# Patient Record
Sex: MALE | Race: WHITE | NOT HISPANIC OR LATINO | Employment: FULL TIME | URBAN - NONMETROPOLITAN AREA
[De-identification: names, ages, dates, MRNs, and addresses within clinical notes are randomized per-mention and may not be internally consistent; named-entity substitution may affect disease eponyms.]

---

## 2019-02-21 ENCOUNTER — OFFICE VISIT CONVERTED (OUTPATIENT)
Dept: FAMILY MEDICINE CLINIC | Age: 49
End: 2019-02-21
Attending: NURSE PRACTITIONER

## 2019-08-05 ENCOUNTER — HOSPITAL ENCOUNTER (OUTPATIENT)
Dept: OTHER | Facility: HOSPITAL | Age: 49
Discharge: HOME | End: 2019-08-05

## 2019-08-05 ENCOUNTER — OFFICE VISIT CONVERTED (OUTPATIENT)
Dept: FAMILY MEDICINE CLINIC | Age: 49
End: 2019-08-05
Attending: NURSE PRACTITIONER

## 2019-08-05 LAB
ALBUMIN SERPL-MCNC: 4.7 G/DL (ref 3.5–5)
ALBUMIN/GLOB SERPL: 1.6 {RATIO} (ref 1.4–2.6)
ALP SERPL-CCNC: 44 U/L (ref 56–119)
ALT SERPL-CCNC: 21 U/L (ref 10–40)
ANION GAP SERPL CALC-SCNC: 19 MMOL/L (ref 8–19)
AST SERPL-CCNC: 23 U/L (ref 15–50)
BASOPHILS # BLD AUTO: 0.09 10*3/UL (ref 0–0.2)
BASOPHILS NFR BLD AUTO: 1.2 % (ref 0–3)
BILIRUB SERPL-MCNC: 0.4 MG/DL (ref 0.2–1.3)
BUN SERPL-MCNC: 19 MG/DL (ref 5–25)
BUN/CREAT SERPL: 18 {RATIO} (ref 6–20)
CALCIUM SERPL-MCNC: 9.8 MG/DL (ref 8.7–10.4)
CHLORIDE SERPL-SCNC: 103 MMOL/L (ref 99–111)
CHOLEST SERPL-MCNC: 166 MG/DL (ref 107–200)
CHOLEST/HDLC SERPL: 4.2 {RATIO} (ref 3–6)
CONV ABS IMM GRAN: 0.02 10*3/UL (ref 0–0.2)
CONV CO2: 24 MMOL/L (ref 22–32)
CONV IMMATURE GRAN: 0.3 % (ref 0–1.8)
CONV TOTAL PROTEIN: 7.6 G/DL (ref 6.3–8.2)
CREAT UR-MCNC: 1.03 MG/DL (ref 0.7–1.2)
DEPRECATED RDW RBC AUTO: 39 FL (ref 35.1–43.9)
EOSINOPHIL # BLD AUTO: 0.11 10*3/UL (ref 0–0.7)
EOSINOPHIL # BLD AUTO: 1.5 % (ref 0–7)
ERYTHROCYTE [DISTWIDTH] IN BLOOD BY AUTOMATED COUNT: 12.2 % (ref 11.6–14.4)
GFR SERPLBLD BASED ON 1.73 SQ M-ARVRAT: >60 ML/MIN/{1.73_M2}
GLOBULIN UR ELPH-MCNC: 2.9 G/DL (ref 2–3.5)
GLUCOSE SERPL-MCNC: 111 MG/DL (ref 70–99)
HCT VFR BLD AUTO: 39.2 % (ref 42–52)
HDLC SERPL-MCNC: 40 MG/DL (ref 40–60)
HGB BLD-MCNC: 13.7 G/DL (ref 14–18)
LDLC SERPL CALC-MCNC: 110 MG/DL (ref 70–100)
LYMPHOCYTES # BLD AUTO: 2.15 10*3/UL (ref 1–5)
LYMPHOCYTES NFR BLD AUTO: 29.6 % (ref 20–45)
MCH RBC QN AUTO: 30.5 PG (ref 27–31)
MCHC RBC AUTO-ENTMCNC: 34.9 G/DL (ref 33–37)
MCV RBC AUTO: 87.3 FL (ref 80–96)
MONOCYTES # BLD AUTO: 0.67 10*3/UL (ref 0.2–1.2)
MONOCYTES NFR BLD AUTO: 9.2 % (ref 3–10)
NEUTROPHILS # BLD AUTO: 4.22 10*3/UL (ref 2–8)
NEUTROPHILS NFR BLD AUTO: 58.2 % (ref 30–85)
NRBC CBCN: 0 % (ref 0–0.7)
OSMOLALITY SERPL CALC.SUM OF ELEC: 297 MOSM/KG (ref 273–304)
PLATELET # BLD AUTO: 286 10*3/UL (ref 130–400)
PMV BLD AUTO: 10.1 FL (ref 9.4–12.4)
POTASSIUM SERPL-SCNC: 3.8 MMOL/L (ref 3.5–5.3)
PSA SERPL-MCNC: 1.31 NG/ML (ref 0–4)
RBC # BLD AUTO: 4.49 10*6/UL (ref 4.7–6.1)
SODIUM SERPL-SCNC: 142 MMOL/L (ref 135–147)
TRIGL SERPL-MCNC: 79 MG/DL (ref 40–150)
TSH SERPL-ACNC: 2.38 M[IU]/L (ref 0.27–4.2)
VLDLC SERPL-MCNC: 16 MG/DL (ref 5–37)
WBC # BLD AUTO: 7.26 10*3/UL (ref 4.8–10.8)

## 2020-02-18 ENCOUNTER — OFFICE VISIT CONVERTED (OUTPATIENT)
Dept: FAMILY MEDICINE CLINIC | Age: 50
End: 2020-02-18
Attending: NURSE PRACTITIONER

## 2020-09-09 ENCOUNTER — OFFICE VISIT CONVERTED (OUTPATIENT)
Dept: FAMILY MEDICINE CLINIC | Age: 50
End: 2020-09-09
Attending: NURSE PRACTITIONER

## 2021-05-18 NOTE — PROGRESS NOTES
Jim Sparks  1970     Office/Outpatient Visit    Visit Date:  01:22 pm    Provider: Dianne Mott N.P. (Assistant: Felicity Parkinson MA)    Location: [facility]        Electronically signed by Dianne Mott N.P. on  2020 03:20:19 PM                             Subjective:        CC: Brendon is a 50 year old White male.  This is a follow-up visit.  establishment with new pcp.          HPI:           Patient presents with essential (primary) hypertension.  His current cardiac medication regimen includes a diuretic ( Hydrochlorothiazide (HCTZ) ).  He is tolerating the medication well without side effects.  Compliance with treatment has been fair; he skips some medication doses due to forgetfulness.  He would like to eat healthy, exercise and get off rx.      ROS:     CONSTITUTIONAL:  Negative for exercising regularly.      CARDIOVASCULAR:  Negative for chest pain, palpitations, tachycardia, orthopnea, and edema.      RESPIRATORY:  Negative for cough, dyspnea, and hemoptysis.      GASTROINTESTINAL:  Negative for melena.      MUSCULOSKELETAL:  Positive for shoulder and neck  pain. seen chiropractor, he fell 1 month ago.  was improving in his shoulder, he had an x-ray's, actually better today    NEUROLOGICAL:  Negative for paresthesias.          Past Medical History / Family History / Social History:         Last Reviewed on 2020 03:17 PM by Dianne Mott    Past Medical History:             PAST MEDICAL HISTORY         Hypertension         PREVENTIVE HEALTH MAINTENANCE             INFLUENZA VACCINE: was last done 2016         Surgical History:     NONE         Family History:     Father: Hypertension;  Type 2 Diabetes     Mother: Hypertension     Brother(s): 3 brother(s) total; 2 ;  Arrhythmia (  at 55 ); Hypertension;  Type 1 Diabetes (  at 50 )     Sister(s): Healthy; 1 sister(s) total     Paternal Grandfather:  at age 70's;   Alcoholism     Paternal Grandmother:  at age 80's     Maternal Grandfather:  at age 30; Cause of death was CVA     Maternal Grandmother:  at age 70's; Cause of death was MI;  Cancer (unspecified type)         Social History:     Occupation: [employer]     Marital Status:      Children: 1 child         Tobacco/Alcohol/Supplements:     Last Reviewed on 2019 10:19 AM by Emiliana Acosta    Tobacco: He has never smoked.          Alcohol: Frequency: Socially     Caffeine:  He admits to consuming caffeine via coffee ( 2 servings per day ) and soda ( 3 servings per day ).          Immunizations:     Fluzone Quadrivalent (3+ years) 10/1/2018        Allergies:     Last Reviewed on 2020 01:26 PM by Felicity Parkinson    No Known Allergies.        Current Medications: has HCTZ but not taking regularly at all     Last Reviewed on 2020 03:18 PM by Dianne Mott    None        Objective:        Vitals:         Current: 2020 1:25:26 PM    Ht:  5 ft, 11 in;  Wt: 233.2 lbs;  BMI: 32.5T: 98.3 F (oral);  BP: 168/101 mm Hg (right arm, sitting);  P: 90 bpm (right arm (BP Cuff), sitting);  sCr: 1.03 mg/dL;  GFR: 91.47        Exams:     PHYSICAL EXAM:     GENERAL: Vitals recorded well developed, well nourished;  well groomed;  no apparent distress;     NECK: carotid exam reveals no bruits;     RESPIRATORY: normal respiratory rate and pattern with no distress; normal breath sounds with no rales, rhonchi, wheezes or rubs;     CARDIOVASCULAR: normal rate; rhythm is regular;  no systolic murmur; no edema;     MUSCULOSKELETAL: full ROM of right shoulder, no pain;     PSYCHIATRIC:  appropriate affect and demeanor; normal speech pattern; grossly normal memory;         Assessment:         I10   Essential (primary) hypertension       M25.511   Pain in right shoulder           ORDERS:         Meds Prescribed:       [New Rx] lisinopriL 10 mg oral tablet [one daily orally], #30 (thirty) tablets, Refills: 2 (two)        [New Rx] Medrol (Jonathan) 4 mg oral Tablet, Dose Pack [take as directed with food], #21 (twenty one) tablets, Refills: 0 (zero)         Lab Orders:       FUTURE  Future order to be done at patients convenience  (Send-Out)            [id number]  [facility] CMP AND LIPID: [id number]  (Send-Out)                      Plan:         Essential (primary) hypertensionswitch to ACE /reviewed his vital sign flow sheet and previous labs        FOLLOW-UP TESTING #1: FOLLOW-UP LABORATORY:  Labs to be scheduled in the future include HTN/Lipid Panel: CMP, Lipid.      RECOMMENDATIONS given include: perform routine monitoring of blood pressure with home blood pressure cuff, reduction of dietary salt intake, weight loss, and Reduce caffiene.      FOLLOW-UP: fasting for labs and recheck bp           Prescriptions:       [New Rx] lisinopriL 10 mg oral tablet [one daily orally], #30 (thirty) tablets, Refills: 2 (two)           Orders:       FUTURE  Future order to be done at patients convenience  (Send-Out)            [id number]  [facility] CMP AND LIPID: [id number]  (Send-Out)              Pain in right shoulderconsider ortho referral if his symptoms are not improving         FOLLOW-UP: Advised to call if there is no improvement 2 weeks.  :.            Prescriptions:       [New Rx] Medrol (Jonathan) 4 mg oral Tablet, Dose Pack [take as directed with food], #21 (twenty one) tablets, Refills: 0 (zero)             Patient Recommendations:        For  Essential (primary) hypertension:            The following laboratory testing has been ordered: Begin monitoring your blood pressure by brief nurse visits at our office, a home blood pressure monitor, or by checking on the machines in pharmacies or stores.  Keep a log of the readings. Reduce the amount of salt in your diet. Try to lose some weight; even modest weight reduction can improve your blood pressure.          For  Pain in right shoulder:    Follow-up by phone if no improvement in 2 weeks.                 APPOINTMENT INFORMATION:        Monday Tuesday Wednesday Thursday Friday Saturday Sunday            Time:___________________AM  PM   Date:_____________________             Charge Capture:         Primary Diagnosis:     I10  Essential (primary) hypertension           Orders:      [id number]  Office/outpatient visit; established patient, level 4  (In-House)              M25.511  Pain in right shoulder

## 2021-05-18 NOTE — PROGRESS NOTES
Jim Sparks  1970     Office/Outpatient Visit    Visit Date: Wed, Sep 9, 2020 03:43 pm    Provider: Dianne Mott N.P. (Assistant: Felicity Parkinson MA)    Location: [facility]        Electronically signed by Dianne Mott N.P. on  09/09/2020 04:18:51 PM                             Subjective:        CC: [phone] dox video NOT TAKING LISINOPRILScott is a 50 year old White male.  This is a follow-up visit.  check up         HPI:           PHQ-9 Depression Screening: Completed form scanned and in chart; Total Score 1           Today's encounter is being done with a telehealth visit. He has consented verbally with two witnesses for todays treatment. Todays visit is being conducted by audio and video. Individuals present during the telemedicine consultation include radha Fernandez & TRISTIN Gonzales In regard to the essential (primary) hypertension, his current cardiac medication regimen includes a diuretic ( Hydrochlorothiazide (HCTZ) ).  He did not bring his blood pressure diary, but says that typical readings show systolics in the 125-150's and diastolics in the .  He is tolerating the medication well without side effects.  Compliance with treatment has been fair; he skips some medication doses due to forgetfulness and does not follow a diet and exercise regimen.  [place] and will be until  but can come home on some weekends.  Was exercising 5-8 miles a day and trying to eat healthy then with COVID, remdoleing, [place] of state all that has fallen by the way side.  Wants to do better.  He tried ACE and took for three days and did not tolerate, just felt weird and does not want to take.      ROS:     CONSTITUTIONAL:  Negative for fever.      CARDIOVASCULAR:  Negative for chest pain, palpitations, tachycardia, orthopnea, and edema.      RESPIRATORY:  Negative for recent cough and dyspnea.      GASTROINTESTINAL:  Negative for abdominal pain, heartburn,  constipation, diarrhea, and stool changes.      NEUROLOGICAL:  Negative for dizziness, headaches, paresthesias, and weakness.          Past Medical History / Family History / Social History:         Last Reviewed on 2020 04:01 PM by Dianne Mott    Past Medical History:             PAST MEDICAL HISTORY         Hypertension         PREVENTIVE HEALTH MAINTENANCE             INFLUENZA VACCINE: was last done 2016         Surgical History:     NONE         Family History:     Father: Hypertension;  Type 2 Diabetes     Mother: Hypertension     Brother(s): 3 brother(s) total; 2 ;  Arrhythmia (  at 55 ); Hypertension;  Type 1 Diabetes (  at 50 )     Sister(s): Healthy; 1 sister(s) total     Paternal Grandfather:  at age 70's;  Alcoholism     Paternal Grandmother:  at age 80's     Maternal Grandfather:  at age 30; Cause of death was CVA     Maternal Grandmother:  at age 70's; Cause of death was MI;  Cancer (unspecified type)         Social History:     Occupation: [employer]     Marital Status:      Children: 1 child         Tobacco/Alcohol/Supplements:     Last Reviewed on 2020 04:15 PM by Dianne Mott    Tobacco: He has never smoked.          Immunizations:     Fluzone Quadrivalent (3+ years) 10/1/2018        Allergies:     Last Reviewed on 2020 03:47 PM by Felicity Parkinson    lisinopril:          Current Medications:     Last Reviewed on 2020 03:46 PM by Felicity Parkinson    hydroCHLOROthiazide 25 mg oral tablet [1 tab daily]    lisinopriL 10 mg oral tablet [one daily orally]        Objective:        Exams:     PHYSICAL EXAM:     GENERAL: Vitals recorded well developed, well nourished;  well groomed;  no apparent distress;     RESPIRATORY: normal appearance and symmetric expansion of chest wall;     NEUROLOGIC: GROSSLY INTACT     PSYCHIATRIC:  appropriate affect and demeanor; normal speech pattern; grossly normal memory;         Assessment:          Z13.31   Encounter for screening for depression       I10   Essential (primary) hypertension           ORDERS:         Meds Prescribed:       [New Rx] amLODIPine 2.5 mg oral tablet [take 1 tablet (2.5 mg) by oral route once daily], #30 (thirty) tablets, Refills: 2 (two)         Lab Orders:       FUTURE  Future order to be done at patients convenience  (Send-Out)            [id number]  [facility] CMP AND LIPID: [id number]  (Send-Out)              Other Orders:         Depression screen negative  (In-House)                      Plan:         Encounter for screening for depression    MIPS PHQ-9 Depression Screening: Completed form scanned and in chart; Total Score 1; Negative Depression Screen Telehealth: Verbal consent obtained for visit to occur via televideo conferencing           Orders:         Depression screen negative  (In-House)              Essential (primary) hypertensionhe has plenty of HCTZ since he only takes half of the time, to take daily, add norvasc        FOLLOW-UP TESTING #1: FOLLOW-UP LABORATORY:  Labs to be scheduled in the future include HTN/Lipid Panel: CMP, Lipid.      RECOMMENDATIONS given include: perform routine monitoring of blood pressure with home blood pressure cuff, reduction of dietary salt intake, and take medication as prescribed, try not to miss doses.      FOLLOW-UP: fasting for labs/ order to be mailed           Prescriptions:       [New Rx] amLODIPine 2.5 mg oral tablet [take 1 tablet (2.5 mg) by oral route once daily], #30 (thirty) tablets, Refills: 2 (two)           Orders:       FUTURE  Future order to be done at patients convenience  (Send-Out)            [id number]  [facility] CMP AND LIPID: [id number]  (Send-Out)                  Patient Recommendations:        For  Essential (primary) hypertension:            The following laboratory testing has been ordered: Begin monitoring your blood pressure by brief nurse visits at our office, a home blood pressure monitor, or  by checking on the machines in pharmacies or stores.  Keep a log of the readings. Reduce the amount of salt in your diet.              Charge Capture:         Primary Diagnosis:     Z13.31  Encounter for screening for depression           Orders:      [id number]  Office/outpatient visit; established patient, level 3  (In-House)              Depression screen negative  (In-House)              I10  Essential (primary) hypertension

## 2021-05-18 NOTE — PROGRESS NOTES
Jim Sparks 1970     Office/Outpatient Visit    Visit Date:  04:58 pm    Provider: Emiliana Acosta N.P. (Assistant: Felicity Parkinson MA)    Location: [facility]        Electronically signed by Emiliana Acosta N.P. on  2019 08:38:34 AM                             SUBJECTIVE:        CC:     Brendon is a 49 year old White male.  He presents with sinus drainage onset 4-5 days. Patient also states he believes his BP is running high. He states he use to be on HCTZ in the past but never got a refill from prescribing Dr.          HPI:         Brendon presents with hypertension.  Pt states being on meds in the past. He was exercising and lowered his bp.          Cough details; pt states sore throat, chest congestion, productive green cough. States nasal drainage. Taking tylenol cold and flu without relief.      ROS:     CONSTITUTIONAL:  Negative for chills, fatigue, fever and weight change.      CARDIOVASCULAR:  Negative for chest pain, orthopnea, paroxysmal nocturnal dyspnea and pedal edema.      RESPIRATORY:  Positive for recent cough.   Negative for dyspnea or cough.      GASTROINTESTINAL:  Negative for abdominal pain, heartburn, constipation, diarrhea, and stool changes.      NEUROLOGICAL:  Negative for dizziness, headaches, paresthesias, and weakness.      PSYCHIATRIC:  Negative for anxiety and depression.          PMH/FMH/SH:     Last Reviewed on 2019 05:29 PM by Emiliana Acosta    Past Medical History:             PAST MEDICAL HISTORY         Hypertension         PREVENTIVE HEALTH MAINTENANCE             INFLUENZA VACCINE: was last done 2016         Surgical History:     NONE         Family History:     Father: Hypertension;  Type 2 Diabetes     Mother: Hypertension     Brother(s): 3 brother(s) total; 1 ;  Hypertension;  Type 1 Diabetes     Sister(s): Healthy; 1 sister(s) total     Paternal Grandfather:  at age 70's;  Alcoholism     Paternal  Grandmother:  at age 80's     Maternal Grandfather:  at age 30; Cause of death was CVA     Maternal Grandmother:  at age 70's; Cause of death was MI;  Cancer (unspecified type)         Social History:     Occupation: [employer]     Marital Status:      Children: 1 child         Tobacco/Alcohol/Supplements:     Last Reviewed on 2019 05:29 PM by Emiliana Acosta    Tobacco: He has never smoked.          Alcohol: Frequency: Socially     Caffeine:  He admits to consuming caffeine via coffee ( 2 servings per day ) and soda ( 3 servings per day ).          Substance Abuse History:     Last Reviewed on 2019 05:29 PM by Emiliana Acosta    NEGATIVE         Mental Health History:     Last Reviewed on 2019 05:29 PM by Emiliana Acosta        Communicable Diseases (eg STDs):     Last Reviewed on 2019 05:29 PM by Emiliana Acosta            Current Problems:     Last Reviewed on 2019 05:29 PM by Emiliana Acosta    Hypertension     Cough         Immunizations:     Fluzone Quadrivalent (3+ years) 10/1/2018         Allergies:     Last Reviewed on 2019 05:29 PM by Emiliana Acosta      No Known Drug Allergies.         Current Medications:     Last Reviewed on 2019 05:29 PM by Emiliana Acosta    None        OBJECTIVE:        Vitals:         Current: 2019 5:05:18 PM    Ht:  5 ft, 11 in;  Wt: 233.4 lbs;  BMI: 32.6    T: 98.4 F (oral);  BP: 163/97 mm Hg (right arm, sitting);  P: 79 bpm (right arm (BP Cuff), sitting);  sCr: 1.12 mg/dL;  GFR: 85.08        Exams:     PHYSICAL EXAM:     GENERAL: Vitals recorded well developed, well nourished;  well groomed;  no apparent distress;     EYES: lids and lacrimal system are normal in appearance; extraocular movements intact; conjunctiva and cornea are normal; PERRLA;     E/N/T: EARS: external auditory canal erythematous on the left;  NOSE: nasal mucosa is erythematous;  OROPHARYNX: oral mucosa reveals erythema;     NECK:  supple, full  ROM; no thyromegaly; no carotid bruits;     RESPIRATORY: normal respiratory rate and pattern with no distress; normal breath sounds with no rales, rhonchi, wheezes or rubs;     CARDIOVASCULAR: normal rate; rhythm is regular;  normal S1; normal S2; no systolic murmur; no cyanosis; no edema;     GASTROINTESTINAL: nontender, nondistended; no hepatosplenomegaly or masses; no bruits;     SKIN:  no significant rashes or lesions; no suspicious moles;     MUSCULOSKELETAL:  Normal range of motion, strength and tone;     NEUROLOGICAL:  cranial nerves, motor and sensory function, reflexes, gait and coordination are all intact;     PSYCHIATRIC:  appropriate affect and demeanor; normal speech pattern; grossly normal memory;         ASSESSMENT:           401.1   I10  Hypertension              DDx:     466.0   E78.5  Acute bronchitis              DDx:         ORDERS:         Meds Prescribed:       Hydrochlorothiazide (HCTZ) 25mg Tablet 1 tab daily  #90 (Ninety) tablet(s) Refills: 0       Augmentin (Amoxicillin/Clavulanate) 875mg/125mg Tablet 1 tab bid X 10 days  #20 (Twenty) tablet(s) Refills: 0       Bromfed-DM (Brompheniramine/Dextromethorphan/Pseudoephedrine) Syrup 1  to 2  tsp po every 4-6 hrs prn cough/congestion.  #240 ([address] [place] [address]) ml Refills: 0         Lab Orders:       APPTO  Appointment need  (In-House)                   PLAN:          Hypertension         FOLLOW-UP: Schedule a follow-up visit in 3 months..            Prescriptions:       Hydrochlorothiazide (HCTZ) 25mg Tablet 1 tab daily  #90 (Ninety) tablet(s) Refills: 0           Orders:       APPTO  Appointment need  (In-House)            Acute bronchitis           Prescriptions:       Augmentin (Amoxicillin/Clavulanate) 875mg/125mg Tablet 1 tab bid X 10 days  #20 (Twenty) tablet(s) Refills: 0       Bromfed-DM (Brompheniramine/Dextromethorphan/Pseudoephedrine) Syrup 1  to 2  tsp po every 4-6 hrs prn cough/congestion.  #240 ([address] [place] [address]) ml  Refills: 0           Patient Education Handouts:       Acute Bronchitis              Patient Recommendations:        For  Hypertension:     Schedule a follow-up visit in 3 months.                APPOINTMENT INFORMATION:        Monday Tuesday Wednesday Thursday Friday Saturday Sunday            Time:___________________AM  PM   Date:_____________________             CHARGE CAPTURE:           Primary Diagnosis:     401.1 Hypertension            I10    Essential (primary) hypertension              Orders:          [id number]   Office/outpatient visit; established patient, level 3  (In-House)             APPTO   Appointment need  (In-House)           466.0 Acute bronchitis            E78.5    Hyperlipidemia, unspecified        ADDENDUMS:      ____________________________________    Addendum: 02/28/2019 10:43 AM - Emiliana Acosta        466.0 Acute bronchitis       Remove:     E78.5   Hyperlipidemia, unspecified    Add: J20.9 Acute bronchitis

## 2021-05-18 NOTE — PROGRESS NOTES
Jim Sparks 1970     Office/Outpatient Visit    Visit Date: Mon, Aug 5, 2019 09:50 am    Provider: Emiliana Acosta N.P. (Assistant: Casie Spicer MA)    Location: [facility]        Electronically signed by Emiliana Acosta N.P. on  08/05/2019 12:22:47 PM                             SUBJECTIVE:        CC:     Brendon is a 49 year old White male.  This is a follow-up visit.          HPI:         Concerning hypertension, pt states doing well on med. Here for f/u. States elevated today due to coming to office and nerves. He puts his pill in his shirt and forgets to take it when he gets to work. States he washes most of his pills!          Concerning health checkup, he cannot recall when he last had a physical exam.  He has never had a chest x-ray. He has never had an ECG. A hearing test was done it was abnormal.   He's had vision screening done 2 years ago and this was abnormal, revealing reading glasses.  He does not perform regular testicular self-exams.  Depression screening is positive for sadness.  He is current with his influenza immunization.      Smoking Status:  Nonsmoker         PHQ-9 Depression Screening: Completed form scanned and in chart; Total Score 0 Alcohol Consumption Screening: Completed form scanned and in chart; Total Score 2     ROS:     CONSTITUTIONAL:  Negative for chills, fatigue and fever.      CARDIOVASCULAR:  Negative for chest pain, orthopnea, paroxysmal nocturnal dyspnea and pedal edema.      RESPIRATORY:  Negative for dyspnea and cough.      GASTROINTESTINAL:  Negative for abdominal pain, heartburn, constipation, diarrhea, and stool changes.      MUSCULOSKELETAL:  Negative for arthralgias, back pain, and myalgias.      NEUROLOGICAL:  Negative for dizziness, headaches, paresthesias, and weakness.      PSYCHIATRIC:  Negative for anxiety and depression.          PMH/FMH/SH:     Last Reviewed on 8/05/2019 10:19 AM by Emiliana Acosta    Past Medical History:              PAST MEDICAL HISTORY         Hypertension         PREVENTIVE HEALTH MAINTENANCE             INFLUENZA VACCINE: was last done 2016         Surgical History:     NONE         Family History:     Father: Hypertension;  Type 2 Diabetes     Mother: Hypertension     Brother(s): 3 brother(s) total; 1 ;  Hypertension;  Type 1 Diabetes     Sister(s): Healthy; 1 sister(s) total     Paternal Grandfather:  at age 70's;  Alcoholism     Paternal Grandmother:  at age 80's     Maternal Grandfather:  at age 30; Cause of death was CVA     Maternal Grandmother:  at age 70's; Cause of death was MI;  Cancer (unspecified type)         Social History:     Occupation: [employer]     Marital Status:      Children: 1 child         Tobacco/Alcohol/Supplements:     Last Reviewed on 2019 10:19 AM by Emiliana Acosta    Tobacco: He has never smoked.          Alcohol: Frequency: Socially     Caffeine:  He admits to consuming caffeine via coffee ( 2 servings per day ) and soda ( 3 servings per day ).          Substance Abuse History:     Last Reviewed on 2019 10:19 AM by Emiliana Acosta    NEGATIVE         Mental Health History:     Last Reviewed on 2019 10:19 AM by Emiliana Acosta        Communicable Diseases (eg STDs):     Last Reviewed on 2019 10:19 AM by Emiliana Acosta            Current Problems:     Last Reviewed on 2019 10:19 AM by Emiliana Acosta    Hypertension     Screening for depression         Immunizations:     Fluzone Quadrivalent (3+ years) 10/1/2018         Allergies:     Last Reviewed on 2019 10:19 AM by Emiliana Acosta      No Known Drug Allergies.         Current Medications:     Last Reviewed on 2019 10:19 AM by Emiliana Acosta    Hydrochlorothiazide (HCTZ) 25mg Tablet 1 tab daily         OBJECTIVE:        Vitals:         Current: 2019 9:55:55 AM    Ht:  5 ft, 11 in;  Wt: 221.8 lbs;  BMI: 30.9    T: 98.6 F (oral);  BP: 151/85 mm Hg (left  arm, sitting);  P: 67 bpm (left arm (BP Cuff), sitting);  sCr: 1.12 mg/dL;  GFR: 83.25        Exams:     PHYSICAL EXAM:     GENERAL: Vitals recorded well developed, well nourished;  well groomed;  no apparent distress;     EYES: lids and lacrimal system are normal in appearance; extraocular movements intact; conjunctiva and cornea are normal; PERRLA;     E/N/T:  normal EACs, TMs, nasal/oral mucosa, teeth, gingiva, and oropharynx;     NECK:  supple, full ROM; no thyromegaly; no carotid bruits;     RESPIRATORY: normal respiratory rate and pattern with no distress; normal breath sounds with no rales, rhonchi, wheezes or rubs;     CARDIOVASCULAR: normal rate; rhythm is regular;  normal S1; normal S2; no systolic murmur; no cyanosis; no edema;     GASTROINTESTINAL: nontender, nondistended; no hepatosplenomegaly or masses; no bruits;     SKIN:  no significant rashes or lesions; no suspicious moles;     MUSCULOSKELETAL:  Normal range of motion, strength and tone;     NEUROLOGICAL:  cranial nerves, motor and sensory function, reflexes, gait and coordination are all intact;     PSYCHIATRIC:  appropriate affect and demeanor; normal speech pattern; grossly normal memory;         ASSESSMENT:           401.1   I10  Hypertension              DDx:     V70.0   Z00.00  Health checkup              DDx:     V76.44   Z12.5  Screening for prostate cancer              DDx:     V79.0   Z13.31  Screening for depression              DDx:         ORDERS:         Meds Prescribed:       Refill of: Hydrochlorothiazide (HCTZ) 25mg Tablet 1 tab daily  #90 (Ninety) tablet(s) Refills: 1         Lab Orders:       [id number]  [facility] PHYSICAL: CMP, CBC, TSH, LIPID: [id number], [id number], [id number]  (Send-Out)         *  [employer] Medicare screening PSA  (Send-Out)           Other Orders:         Depression screen negative  (In-House)           Negative EtOH screen  (In-House)                   PLAN: Refused Colonoscopy.          Hypertension            Prescriptions:       Refill of: Hydrochlorothiazide (HCTZ) 25mg Tablet 1 tab daily  #90 (Ninety) tablet(s) Refills: 1          Health checkup     LABORATORY:  Labs ordered to be performed today include PHYSICAL PANEL; CMP, CBC, TSH, LIPID and PSA.  MIPS Negative Depression Screen Negative alcohol screen           Orders:       [id number]  [facility] PHYSICAL: CMP, CBC, TSH, LIPID: [id number], [id number], [id number], [id number]  (Send-Out)         *  [employer] Medicare screening PSA  (Send-Out)           Depression screen negative  (In-House)           Negative EtOH screen  (In-House)               Other Orders:       [id number]  Office/outpatient visit; established patient, level 3  (In-House)           CHARGE CAPTURE:           Primary Diagnosis:     401.1 Hypertension            I10    Essential (primary) hypertension    V70.0 Health checkup            Z00.00    Encounter for general adult medical examination without abnormal findings              Orders:          [id number]   Preventive medicine, established patient, age 40-64 years  (In-House)                Depression screen negative  (In-House)                Negative EtOH screen  (In-House)           V76.44 Screening for prostate cancer            Z12.5    Encounter for screening for malignant neoplasm of prostate    V79.0 Screening for depression            Z13.31    Encounter for screening for depression        Other Orders:           [id number]  Office/outpatient visit; established patient, level 3  (In-House)

## 2021-06-22 NOTE — TELEPHONE ENCOUNTER
Pharmacy is requesting a refill of HCTZ 25mg one daily. LV- 09/09/20, LF- 03/23/21, #90. Dmitri pt needs appt.

## 2021-06-23 DIAGNOSIS — I10 ESSENTIAL HYPERTENSION: Primary | ICD-10-CM

## 2021-06-23 RX ORDER — HYDROCHLOROTHIAZIDE 25 MG/1
25 TABLET ORAL DAILY
Qty: 30 TABLET | Refills: 0 | Status: SHIPPED | OUTPATIENT
Start: 2021-06-23 | End: 2021-06-25

## 2021-06-23 NOTE — TELEPHONE ENCOUNTER
LAST OV: 9/9/20  NEXT OV: None  LAST LAB: 8/5/19    PLEASE SIGN OR ADVISE    Lab Results   Component Value Date    BUN 19 08/05/2019    CREATININE 1.03 08/05/2019    BCR 18 08/05/2019    K 3.8 08/05/2019    CO2 24 08/05/2019    CALCIUM 9.8 08/05/2019    ALBUMIN 4.7 08/05/2019    LABIL2 1.6 08/05/2019    AST 23 08/05/2019    ALT 21 08/05/2019     Contains abnormal data Lipid Panel  Order: [id number]  Status:  Final result   Visible to patient:  No (not released)   Next appt:  None       Component   Ref Range & Units 1 yr ago   Triglycerides   40 - 150 mg/dL 79    Comment: <150 mg/dL-Normal   150-199 mg/dL-Borderline High   200-499 mg/dL-High   >500 mg/dL- Very High    Total Cholesterol   107 - 200 mg/dL 166    Comment: <200 mg/dL-Desirable   200-239 mg/dL-Borderline High   >240 mg/dL-High   >500 mg/dL-Very High    HDL Cholesterol   40 - 60 mg/dL 40    Comment: <40 mg/dL-Low   >60 mg/dL- Desirable    Chol/HDL Ratio   3.0 - 6.0 NA 4.2    LDL Cholesterol    70 - 100 mg/dL 110High     Comment: Recommended  LDL Levels   <100 mg/dL-Optimal   100-129 mg/dL-Near Optimal/above optimal   130-159 mg/dL-Borderline High   160-189 mg/dL-High   >190 mg/dL-Very High    VLDL Cholesterol   5 - 37 mg/dL 16          Specimen Collected: 08/05/19 10:48 Last Resulted: 08/05/19 15:48

## 2021-06-25 RX ORDER — HYDROCHLOROTHIAZIDE 25 MG/1
25 TABLET ORAL DAILY
Qty: 30 TABLET | Refills: 0 | Status: SHIPPED | OUTPATIENT
Start: 2021-06-25 | End: 2021-09-08 | Stop reason: SDUPTHER

## 2021-06-28 RX ORDER — HYDROCHLOROTHIAZIDE 25 MG/1
TABLET ORAL
Qty: 90 TABLET | OUTPATIENT
Start: 2021-06-28

## 2021-06-28 RX ORDER — AMLODIPINE BESYLATE 2.5 MG/1
1 TABLET ORAL DAILY
COMMUNITY
Start: 2021-06-04 | End: 2021-09-01

## 2021-06-28 NOTE — TELEPHONE ENCOUNTER
LMTRC, asked to call and schedule an appointment for follow up and to have labs done prior to appointment.

## 2021-07-01 VITALS
SYSTOLIC BLOOD PRESSURE: 163 MMHG | TEMPERATURE: 98.4 F | HEART RATE: 79 BPM | BODY MASS INDEX: 32.68 KG/M2 | DIASTOLIC BLOOD PRESSURE: 97 MMHG | WEIGHT: 233.4 LBS | HEIGHT: 71 IN

## 2021-07-01 VITALS
WEIGHT: 221.8 LBS | DIASTOLIC BLOOD PRESSURE: 85 MMHG | BODY MASS INDEX: 31.05 KG/M2 | TEMPERATURE: 98.6 F | SYSTOLIC BLOOD PRESSURE: 151 MMHG | HEART RATE: 67 BPM | HEIGHT: 71 IN

## 2021-07-02 VITALS
SYSTOLIC BLOOD PRESSURE: 168 MMHG | BODY MASS INDEX: 32.65 KG/M2 | WEIGHT: 233.2 LBS | TEMPERATURE: 98.3 F | HEIGHT: 71 IN | HEART RATE: 90 BPM | DIASTOLIC BLOOD PRESSURE: 101 MMHG

## 2021-07-27 RX ORDER — HYDROCHLOROTHIAZIDE 25 MG/1
TABLET ORAL
Qty: 30 TABLET | Refills: 0 | OUTPATIENT
Start: 2021-07-27

## 2021-09-01 ENCOUNTER — LAB (OUTPATIENT)
Dept: LAB | Facility: HOSPITAL | Age: 51
End: 2021-09-01

## 2021-09-01 DIAGNOSIS — I10 ESSENTIAL HYPERTENSION: ICD-10-CM

## 2021-09-01 LAB
ALBUMIN SERPL-MCNC: 4.3 G/DL (ref 3.5–5.2)
ALBUMIN/GLOB SERPL: 1.4 G/DL
ALP SERPL-CCNC: 46 U/L (ref 39–117)
ALT SERPL W P-5'-P-CCNC: 21 U/L (ref 1–41)
ANION GAP SERPL CALCULATED.3IONS-SCNC: 9.9 MMOL/L (ref 5–15)
AST SERPL-CCNC: 19 U/L (ref 1–40)
BASOPHILS # BLD AUTO: 0.07 10*3/MM3 (ref 0–0.2)
BASOPHILS NFR BLD AUTO: 0.9 % (ref 0–1.5)
BILIRUB SERPL-MCNC: 0.3 MG/DL (ref 0–1.2)
BUN SERPL-MCNC: 19 MG/DL (ref 6–20)
BUN/CREAT SERPL: 17.6 (ref 7–25)
CALCIUM SPEC-SCNC: 9.2 MG/DL (ref 8.6–10.5)
CHLORIDE SERPL-SCNC: 103 MMOL/L (ref 98–107)
CHOLEST SERPL-MCNC: 199 MG/DL (ref 0–200)
CO2 SERPL-SCNC: 26.1 MMOL/L (ref 22–29)
CREAT SERPL-MCNC: 1.08 MG/DL (ref 0.76–1.27)
DEPRECATED RDW RBC AUTO: 38.5 FL (ref 37–54)
EOSINOPHIL # BLD AUTO: 0.18 10*3/MM3 (ref 0–0.4)
EOSINOPHIL NFR BLD AUTO: 2.4 % (ref 0.3–6.2)
ERYTHROCYTE [DISTWIDTH] IN BLOOD BY AUTOMATED COUNT: 12.1 % (ref 12.3–15.4)
GFR SERPL CREATININE-BSD FRML MDRD: 72 ML/MIN/1.73
GLOBULIN UR ELPH-MCNC: 3 GM/DL
GLUCOSE SERPL-MCNC: 104 MG/DL (ref 65–99)
HCT VFR BLD AUTO: 40.5 % (ref 37.5–51)
HDLC SERPL-MCNC: 37 MG/DL (ref 40–60)
HGB BLD-MCNC: 14.4 G/DL (ref 13–17.7)
IMM GRANULOCYTES # BLD AUTO: 0.03 10*3/MM3 (ref 0–0.05)
IMM GRANULOCYTES NFR BLD AUTO: 0.4 % (ref 0–0.5)
LDLC SERPL CALC-MCNC: 144 MG/DL (ref 0–100)
LDLC/HDLC SERPL: 3.84 {RATIO}
LYMPHOCYTES # BLD AUTO: 1.99 10*3/MM3 (ref 0.7–3.1)
LYMPHOCYTES NFR BLD AUTO: 26.1 % (ref 19.6–45.3)
MCH RBC QN AUTO: 30.6 PG (ref 26.6–33)
MCHC RBC AUTO-ENTMCNC: 35.6 G/DL (ref 31.5–35.7)
MCV RBC AUTO: 86.2 FL (ref 79–97)
MONOCYTES # BLD AUTO: 0.61 10*3/MM3 (ref 0.1–0.9)
MONOCYTES NFR BLD AUTO: 8 % (ref 5–12)
NEUTROPHILS NFR BLD AUTO: 4.75 10*3/MM3 (ref 1.7–7)
NEUTROPHILS NFR BLD AUTO: 62.2 % (ref 42.7–76)
PLATELET # BLD AUTO: 276 10*3/MM3 (ref 140–450)
PMV BLD AUTO: 9.8 FL (ref 6–12)
POTASSIUM SERPL-SCNC: 3.7 MMOL/L (ref 3.5–5.2)
PROT SERPL-MCNC: 7.3 G/DL (ref 6–8.5)
RBC # BLD AUTO: 4.7 10*6/MM3 (ref 4.14–5.8)
SODIUM SERPL-SCNC: 139 MMOL/L (ref 136–145)
TRIGL SERPL-MCNC: 99 MG/DL (ref 0–150)
VLDLC SERPL-MCNC: 18 MG/DL (ref 5–40)
WBC # BLD AUTO: 7.63 10*3/MM3 (ref 3.4–10.8)

## 2021-09-01 PROCEDURE — 36415 COLL VENOUS BLD VENIPUNCTURE: CPT

## 2021-09-01 PROCEDURE — 85025 COMPLETE CBC W/AUTO DIFF WBC: CPT

## 2021-09-01 PROCEDURE — 80053 COMPREHEN METABOLIC PANEL: CPT

## 2021-09-01 PROCEDURE — 80061 LIPID PANEL: CPT

## 2021-09-01 RX ORDER — AMLODIPINE BESYLATE 2.5 MG/1
2.5 TABLET ORAL DAILY
Qty: 30 TABLET | Refills: 0 | Status: SHIPPED | OUTPATIENT
Start: 2021-09-01 | End: 2021-09-03

## 2021-09-01 NOTE — TELEPHONE ENCOUNTER
Rx Refill Note  Requested Prescriptions     Pending Prescriptions Disp Refills   • amLODIPine (NORVASC) 2.5 MG tablet [Pharmacy Med Name: AMLODIPINE BESYLATE 2.5MG TABLETS] 90 tablet      Sig: TAKE 1 TABLET(2.5 MG) BY MOUTH EVERY DAY      Last office visit with prescribing clinician:9/9/20      Next office visit with prescribing clinician: Visit date not found/ LMTRC NEEDS APPT      {TIP  Please add Last Relevant Lab Date if appropriate 9/1/21  {TIP  Is Refill Pharmacy correct?YES  Amy Souza  09/01/21, 15:39 EDT

## 2021-09-03 RX ORDER — AMLODIPINE BESYLATE 2.5 MG/1
2.5 TABLET ORAL DAILY
Qty: 90 TABLET | Refills: 0 | Status: SHIPPED | OUTPATIENT
Start: 2021-09-03 | End: 2021-10-04 | Stop reason: DRUGHIGH

## 2021-09-07 RX ORDER — HYDROCHLOROTHIAZIDE 25 MG/1
25 TABLET ORAL DAILY
Qty: 30 TABLET | Refills: 0 | Status: CANCELLED | OUTPATIENT
Start: 2021-09-07 | End: 2021-10-07

## 2021-09-07 NOTE — TELEPHONE ENCOUNTER
Caller: Jim Sparks    Relationship to patient: Self    Best call back number: [phone]     Patient is needing: PATIENT RETURNING NURSES CALL, PLEASE CALL BACK AND ADVISE

## 2021-09-08 ENCOUNTER — TELEPHONE (OUTPATIENT)
Dept: FAMILY MEDICINE CLINIC | Age: 51
End: 2021-09-08

## 2021-09-08 DIAGNOSIS — I10 ESSENTIAL HYPERTENSION: Primary | ICD-10-CM

## 2021-09-08 DIAGNOSIS — I10 ESSENTIAL HYPERTENSION: ICD-10-CM

## 2021-09-08 RX ORDER — HYDROCHLOROTHIAZIDE 25 MG/1
25 TABLET ORAL DAILY
Qty: 90 TABLET | Refills: 0 | Status: SHIPPED | OUTPATIENT
Start: 2021-09-08 | End: 2021-09-14 | Stop reason: SDUPTHER

## 2021-09-08 NOTE — TELEPHONE ENCOUNTER
Rx Refill Note  Requested Prescriptions     Signed Prescriptions Disp Refills   • hydroCHLOROthiazide (HYDRODIURIL) 25 MG tablet 90 tablet 0     Sig: Take 1 tablet by mouth Daily.     Authorizing Provider: KARIS PANDEY     Ordering User: CRYSTAL MIKE      Last office visit with prescribing clinician: 09/09/20 telehealth    Next office visit with prescribing clinician: 10/4/2021      Mita Mike LPN  09/08/21, 09:47 EDT     Pt inf of recent lab results.  He said he is out of HCTZ 25mg and needs a refill      Dr Walton

## 2021-09-08 NOTE — TELEPHONE ENCOUNTER
Hub to read    Pt has been trying to get a call back from a nurse for 2 days he states. Pt is calling in regards to his meds not being filled. Pt would like a call back soon as possible.

## 2021-09-10 NOTE — TELEPHONE ENCOUNTER
Caller: Bridger Jim VENCES    Relationship: Self    Best call back number: [phone]    Medication needed:   Requested Prescriptions     Pending Prescriptions Disp Refills   • hydroCHLOROthiazide (HYDRODIURIL) 25 MG tablet [Pharmacy Med Name: HYDROCHLOROTHIAZIDE 25MG TABLETS] 30 tablet      Sig: TAKE 1 TABLET BY MOUTH DAILY       When do you need the refill by: 09/10/2021    What additional details did the patient provide when requesting the medication: PATIENT STATED [facility] HAS BEEN CLOSED FOR SEVERAL DAYS, HE WILL BE LEAVING OUT OF TOWN AND NEEDS HIS REFILLS SENT TO [facility] INSTEAD, AND WANTS HIS PRESCRIPTIONS SENT HERE FROM NOW ON. PATIENT REQUESTED SOMEONE TO REACH OUT ONCE MEDICATION HAS BEEN SENT AND APPROVED. PATIENT HAS A DENTIST APPOINTMENT AT 1, IF CALLING THEN, OKAY TO LEAVE A DETAILED VOICEMAIL.     Does the patient have less than a 3 day supply:  [x] Yes  [] No    What is the patient's preferred pharmacy: [facility] - [place], [place] - [address] [facility] - [phone] [facility] [phone] FX

## 2021-09-11 DIAGNOSIS — I10 ESSENTIAL HYPERTENSION: ICD-10-CM

## 2021-09-14 DIAGNOSIS — I10 ESSENTIAL HYPERTENSION: ICD-10-CM

## 2021-09-14 RX ORDER — HYDROCHLOROTHIAZIDE 25 MG/1
25 TABLET ORAL DAILY
Qty: 90 TABLET | Refills: 0 | OUTPATIENT
Start: 2021-09-14

## 2021-09-14 RX ORDER — HYDROCHLOROTHIAZIDE 25 MG/1
25 TABLET ORAL DAILY
Qty: 90 TABLET | Refills: 0 | Status: SHIPPED | OUTPATIENT
Start: 2021-09-14 | End: 2021-10-04 | Stop reason: SDUPTHER

## 2021-10-04 ENCOUNTER — OFFICE VISIT (OUTPATIENT)
Dept: FAMILY MEDICINE CLINIC | Age: 51
End: 2021-10-04

## 2021-10-04 VITALS
SYSTOLIC BLOOD PRESSURE: 161 MMHG | HEART RATE: 70 BPM | WEIGHT: 245.8 LBS | DIASTOLIC BLOOD PRESSURE: 99 MMHG | BODY MASS INDEX: 34.28 KG/M2

## 2021-10-04 DIAGNOSIS — Z23 IMMUNIZATION DUE: Primary | ICD-10-CM

## 2021-10-04 DIAGNOSIS — E78.49 OTHER HYPERLIPIDEMIA: ICD-10-CM

## 2021-10-04 DIAGNOSIS — I10 ESSENTIAL HYPERTENSION: ICD-10-CM

## 2021-10-04 PROCEDURE — 99213 OFFICE O/P EST LOW 20 MIN: CPT | Performed by: NURSE PRACTITIONER

## 2021-10-04 PROCEDURE — 90686 IIV4 VACC NO PRSV 0.5 ML IM: CPT | Performed by: NURSE PRACTITIONER

## 2021-10-04 PROCEDURE — 90471 IMMUNIZATION ADMIN: CPT | Performed by: NURSE PRACTITIONER

## 2021-10-04 RX ORDER — AMLODIPINE BESYLATE 5 MG/1
5 TABLET ORAL DAILY
Qty: 90 TABLET | Refills: 1 | Status: SHIPPED | OUTPATIENT
Start: 2021-10-04 | End: 2021-12-07 | Stop reason: SDUPTHER

## 2021-10-04 RX ORDER — HYDROCHLOROTHIAZIDE 25 MG/1
25 TABLET ORAL DAILY
Qty: 90 TABLET | Refills: 1 | Status: SHIPPED | OUTPATIENT
Start: 2021-10-04 | End: 2021-12-07 | Stop reason: SDUPTHER

## 2021-10-04 NOTE — ASSESSMENT & PLAN NOTE
BP up, will increase his norvasc, continue HCTZ,  to start to monitor BP at home.  to modify diet and lifestyle. Will need labs every 6 months and follow up.   Reviewed labs with him today from earlier this month

## 2021-10-04 NOTE — PROGRESS NOTES
Jim Sparks presents to [facility].    Chief Complaint:  Hypertension (follow up )         History of Present Illness:  Hypertension:  Current medication HCTZ/norvasc  Tolerating Medication: yes   Checking BP at home and it is not checking   Needs refills: Yes \[facility]  Labs   Lab Results       Component                Value               Date                       GLUCOSE                  104 (H)             09/01/2021                 BUN                      19                  09/01/2021                 CREATININE               1.08                09/01/2021                 EGFRIFNONA               72                  09/01/2021                 BCR                      17.6                09/01/2021                 K                        3.7                 09/01/2021                 CO2                      26.1                09/01/2021                 CALCIUM                  9.2                 09/01/2021                 ALBUMIN                  4.30                09/01/2021                 LABIL2                   1.6                 08/05/2019                 AST                      19                  09/01/2021                 ALT                      21                  09/01/2021              Hyperlipidemia  Current medication none and is not working on diet and exercise     Lab Results       Component                Value               Date                       CHOL                     199                 09/01/2021                 CHLPL                    166                 08/05/2019                 TRIG                     99                  09/01/2021                 HDL                      37 (L)              09/01/2021                 LDL                      144 (H)             09/01/2021                    PMH changes since 9-2020: no surgery or hospitalization's   Works [employer]    One child             Review of Systems:  Review of Systems   Eyes:  Negative for blurred vision.   Respiratory: Negative for shortness of breath.    Cardiovascular: Negative for chest pain and palpitations.   Musculoskeletal: Negative for neck pain.          Vital Signs:   /99 (BP Location: Right arm, Patient Position: Sitting)   Pulse 70   Wt 111 kg (245 lb 12.8 oz)   BMI 34.28 kg/m²       Physical Exam:  Physical Exam  Vitals reviewed.   Constitutional:       General: He is not in acute distress.     Appearance: Normal appearance.   Neck:      Vascular: No carotid bruit.   Cardiovascular:      Rate and Rhythm: Normal rate and regular rhythm.      Heart sounds: Normal heart sounds. No murmur heard.     Pulmonary:      Effort: Pulmonary effort is normal. No respiratory distress.      Breath sounds: Normal breath sounds.   Musculoskeletal:      Right lower leg: No edema.      Left lower leg: No edema.   Neurological:      Mental Status: He is alert.   Psychiatric:         Mood and Affect: Mood normal.         Behavior: Behavior normal.         Result Review      The following data was reviewed by: TRISTIN Marino on 10/04/2021:    Results for orders placed or performed in visit on 09/01/21   Comprehensive metabolic panel    Specimen: Blood   Result Value Ref Range    Glucose 104 (H) 65 - 99 mg/dL    BUN 19 6 - 20 mg/dL    Creatinine 1.08 0.76 - 1.27 mg/dL    Sodium 139 136 - 145 mmol/L    Potassium 3.7 3.5 - 5.2 mmol/L    Chloride 103 98 - 107 mmol/L    CO2 26.1 22.0 - 29.0 mmol/L    Calcium 9.2 8.6 - 10.5 mg/dL    Total Protein 7.3 6.0 - 8.5 g/dL    Albumin 4.30 3.50 - 5.20 g/dL    ALT (SGPT) 21 1 - 41 U/L    AST (SGOT) 19 1 - 40 U/L    Alkaline Phosphatase 46 39 - 117 U/L    Total Bilirubin 0.3 0.0 - 1.2 mg/dL    eGFR Non African Amer 72 >60 mL/min/1.73    Globulin 3.0 gm/dL    A/G Ratio 1.4 g/dL    BUN/Creatinine Ratio 17.6 7.0 - 25.0    Anion Gap 9.9 5.0 - 15.0 mmol/L   Lipid panel    Specimen: Blood   Result Value Ref Range    Total Cholesterol 199 0 - 200  mg/dL    Triglycerides 99 0 - 150 mg/dL    HDL Cholesterol 37 (L) 40 - 60 mg/dL    LDL Cholesterol  144 (H) 0 - 100 mg/dL    VLDL Cholesterol 18 5 - 40 mg/dL    LDL/HDL Ratio 3.84    CBC Auto Differential    Specimen: Blood   Result Value Ref Range    WBC 7.63 3.40 - 10.80 10*3/mm3    RBC 4.70 4.14 - 5.80 10*6/mm3    Hemoglobin 14.4 13.0 - 17.7 g/dL    Hematocrit 40.5 37.5 - 51.0 %    MCV 86.2 79.0 - 97.0 fL    MCH 30.6 26.6 - 33.0 pg    MCHC 35.6 31.5 - 35.7 g/dL    RDW 12.1 (L) 12.3 - 15.4 %    RDW-SD 38.5 37.0 - 54.0 fl    MPV 9.8 6.0 - 12.0 fL    Platelets 276 140 - 450 10*3/mm3    Neutrophil % 62.2 42.7 - 76.0 %    Lymphocyte % 26.1 19.6 - 45.3 %    Monocyte % 8.0 5.0 - 12.0 %    Eosinophil % 2.4 0.3 - 6.2 %    Basophil % 0.9 0.0 - 1.5 %    Immature Grans % 0.4 0.0 - 0.5 %    Neutrophils, Absolute 4.75 1.70 - 7.00 10*3/mm3    Lymphocytes, Absolute 1.99 0.70 - 3.10 10*3/mm3    Monocytes, Absolute 0.61 0.10 - 0.90 10*3/mm3    Eosinophils, Absolute 0.18 0.00 - 0.40 10*3/mm3    Basophils, Absolute 0.07 0.00 - 0.20 10*3/mm3    Immature Grans, Absolute 0.03 0.00 - 0.05 10*3/mm3               Assessment and Plan:          Diagnoses and all orders for this visit:    1. Immunization due (Primary)  -     FluLaval/Fluarix (VFC) >6 Months    2. Essential hypertension  Assessment & Plan:  BP up, will increase his norvasc, continue HCTZ,  to start to monitor BP at home.  to modify diet and lifestyle. Will need labs every 6 months and follow up.   Reviewed labs with him today from earlier this month     Orders:  -     hydroCHLOROthiazide (HYDRODIURIL) 25 MG tablet; Take 1 tablet by mouth Daily.  Dispense: 90 tablet; Refill: 1  -     amLODIPine (Norvasc) 5 MG tablet; Take 1 tablet by mouth Daily.  Dispense: 90 tablet; Refill: 1    3. Other hyperlipidemia  Assessment & Plan:  Increase  efforts with diet and exercise.             Follow Up   Return in about 6 months (around 4/4/2022) for sooner if BP not to goal .  Patient was  given instructions and counseling regarding his condition or for health maintenance advice. Please see specific information pulled into the AVS if appropriate.

## 2021-12-06 RX ORDER — AMLODIPINE BESYLATE 2.5 MG/1
2.5 TABLET ORAL DAILY
Qty: 90 TABLET | Refills: 0 | OUTPATIENT
Start: 2021-12-06

## 2021-12-07 DIAGNOSIS — I10 ESSENTIAL HYPERTENSION: ICD-10-CM

## 2021-12-07 NOTE — TELEPHONE ENCOUNTER
Caller: Jim Sparks    Relationship: Self    Best call back number: [phone]     Requested Prescriptions:   Requested Prescriptions     Pending Prescriptions Disp Refills   • hydroCHLOROthiazide (HYDRODIURIL) 25 MG tablet 90 tablet 1     Sig: Take 1 tablet by mouth Daily.   • amLODIPine (Norvasc) 5 MG tablet 90 tablet 1     Sig: Take 1 tablet by mouth Daily.        Pharmacy where request should be sent: [id number] - [place], [place] - [address] AT [facility] [address] to [facility] - [phone] [facility] [phone] FX     PT WOULD LIKE SOMEONE TO CALL HIM.  THANK YOU.      Does the patient have less than a 3 day supply:  [] Yes  [x] No    Jordan Koch Rep   12/07/21 12:07 EST

## 2021-12-08 RX ORDER — HYDROCHLOROTHIAZIDE 25 MG/1
25 TABLET ORAL DAILY
Qty: 90 TABLET | Refills: 1 | Status: SHIPPED | OUTPATIENT
Start: 2021-12-08 | End: 2022-04-07 | Stop reason: SDUPTHER

## 2021-12-08 RX ORDER — AMLODIPINE BESYLATE 5 MG/1
5 TABLET ORAL DAILY
Qty: 90 TABLET | Refills: 1 | Status: SHIPPED | OUTPATIENT
Start: 2021-12-08 | End: 2022-04-07 | Stop reason: DRUGHIGH

## 2022-04-07 ENCOUNTER — OFFICE VISIT (OUTPATIENT)
Dept: FAMILY MEDICINE CLINIC | Age: 52
End: 2022-04-07

## 2022-04-07 VITALS
WEIGHT: 236.2 LBS | SYSTOLIC BLOOD PRESSURE: 155 MMHG | HEART RATE: 65 BPM | DIASTOLIC BLOOD PRESSURE: 93 MMHG | BODY MASS INDEX: 32.94 KG/M2

## 2022-04-07 DIAGNOSIS — I10 ESSENTIAL HYPERTENSION: Primary | ICD-10-CM

## 2022-04-07 DIAGNOSIS — E78.49 OTHER HYPERLIPIDEMIA: ICD-10-CM

## 2022-04-07 PROCEDURE — 99213 OFFICE O/P EST LOW 20 MIN: CPT | Performed by: NURSE PRACTITIONER

## 2022-04-07 RX ORDER — AMLODIPINE BESYLATE 10 MG/1
10 TABLET ORAL DAILY
Qty: 90 TABLET | Refills: 0 | Status: SHIPPED | OUTPATIENT
Start: 2022-04-07 | End: 2022-07-05

## 2022-04-07 RX ORDER — HYDROCHLOROTHIAZIDE 25 MG/1
25 TABLET ORAL DAILY
Qty: 90 TABLET | Refills: 1 | Status: SHIPPED | OUTPATIENT
Start: 2022-04-07 | End: 2023-01-25 | Stop reason: SDUPTHER

## 2022-04-07 NOTE — ASSESSMENT & PLAN NOTE
Reviewed labs, return fasting for labs, continue efforts with diet and regular  Exercise  Advised to get covid booster.

## 2022-04-07 NOTE — PROGRESS NOTES
Jim Sparks presents to [facility].    Chief Complaint:  Hypertension and Hyperlipidemia (6 month )         History of Present Illness:  Hyperlipidemia  Tries to eat healthy   Has lost weight    Lab Results       Component                Value               Date                       CHOL                     199                 09/01/2021                 CHLPL                    166                 08/05/2019                 TRIG                     99                  09/01/2021                 HDL                      37 (L)              09/01/2021                 LDL                      144 (H)             09/01/2021              Hypertension:  Current medication: HCTZ/ Norvasc   Tolerating Medication: Yes  Checking BP at home and it is: not checking   Needs refills: Yes, [facility]   Labs:  Lab Results       Component                Value               Date                       GLUCOSE                  104 (H)             09/01/2021                 BUN                      19                  09/01/2021                 CREATININE               1.08                09/01/2021                 EGFRIFNONA               72                  09/01/2021                 BCR                      17.6                09/01/2021                 K                        3.7                 09/01/2021                 CO2                      26.1                09/01/2021                 CALCIUM                  9.2                 09/01/2021                 ALBUMIN                  4.30                09/01/2021                 LABIL2                   1.6                 08/05/2019                 AST                      19                  09/01/2021                 ALT                      21                  09/01/2021              PAST MEDICAL HISTORY changes since 9-2021: none         Hypertension         PREVENTIVE HEALTH MAINTENANCE             INFLUENZA VACCINE: was last done fall  2016         Surgical History:     NONE         Family History:     Father: Hypertension;  Type 2 Diabetes     Mother: Hypertension     Brother(s): 3 brother(s) total; 2 ;  Arrhythmia (  at 55 ); Hypertension;  Type 1 Diabetes (  at 50 )     Sister(s): Healthy; 1 sister(s) total     Paternal Grandfather:  at age 70's;  Alcoholism     Paternal Grandmother:  at age 80's     Maternal Grandfather:  at age 30; Cause of death was CVA     Maternal Grandmother:  at age 70's; Cause of death was MI;  Cancer (unspecified type)         Social History:     Occupation: [employer]     Marital Status:      Children: 1 child           Review of Systems:  Review of Systems   Constitutional: Negative for fatigue and fever.   Respiratory: Negative for cough and shortness of breath.    Cardiovascular: Negative for chest pain, palpitations and leg swelling.   Neurological: Negative for numbness.          Current Outpatient Medications:   •  hydroCHLOROthiazide (HYDRODIURIL) 25 MG tablet, Take 1 tablet by mouth Daily., Disp: 90 tablet, Rfl: 1  •  amLODIPine (Norvasc) 10 MG tablet, Take 1 tablet by mouth Daily., Disp: 90 tablet, Rfl: 0    Vital Signs:   Vitals:    22 1338 22 1407   BP: (!) 170/105 155/93   BP Location: Right arm Right arm   Patient Position: Sitting Sitting   Pulse: 67 65   Weight: 107 kg (236 lb 3.2 oz)          Physical Exam:  Physical Exam  Vitals reviewed.   Constitutional:       General: He is not in acute distress.     Appearance: Normal appearance.   Neck:      Vascular: No carotid bruit.   Cardiovascular:      Rate and Rhythm: Normal rate and regular rhythm.      Heart sounds: Normal heart sounds. No murmur heard.  Pulmonary:      Effort: Pulmonary effort is normal. No respiratory distress.      Breath sounds: Normal breath sounds.   Musculoskeletal:      Right lower leg: No edema.      Left lower leg: No edema.   Neurological:      Mental Status: He is alert.    Psychiatric:         Mood and Affect: Mood normal.         Behavior: Behavior normal.         Result Review      The following data was reviewed by: TRISTIN Marino on 04/07/2022:    Results for orders placed or performed in visit on 09/01/21   Comprehensive metabolic panel    Specimen: Blood   Result Value Ref Range    Glucose 104 (H) 65 - 99 mg/dL    BUN 19 6 - 20 mg/dL    Creatinine 1.08 0.76 - 1.27 mg/dL    Sodium 139 136 - 145 mmol/L    Potassium 3.7 3.5 - 5.2 mmol/L    Chloride 103 98 - 107 mmol/L    CO2 26.1 22.0 - 29.0 mmol/L    Calcium 9.2 8.6 - 10.5 mg/dL    Total Protein 7.3 6.0 - 8.5 g/dL    Albumin 4.30 3.50 - 5.20 g/dL    ALT (SGPT) 21 1 - 41 U/L    AST (SGOT) 19 1 - 40 U/L    Alkaline Phosphatase 46 39 - 117 U/L    Total Bilirubin 0.3 0.0 - 1.2 mg/dL    eGFR Non African Amer 72 >60 mL/min/1.73    Globulin 3.0 gm/dL    A/G Ratio 1.4 g/dL    BUN/Creatinine Ratio 17.6 7.0 - 25.0    Anion Gap 9.9 5.0 - 15.0 mmol/L   Lipid panel    Specimen: Blood   Result Value Ref Range    Total Cholesterol 199 0 - 200 mg/dL    Triglycerides 99 0 - 150 mg/dL    HDL Cholesterol 37 (L) 40 - 60 mg/dL    LDL Cholesterol  144 (H) 0 - 100 mg/dL    VLDL Cholesterol 18 5 - 40 mg/dL    LDL/HDL Ratio 3.84    CBC Auto Differential    Specimen: Blood   Result Value Ref Range    WBC 7.63 3.40 - 10.80 10*3/mm3    RBC 4.70 4.14 - 5.80 10*6/mm3    Hemoglobin 14.4 13.0 - 17.7 g/dL    Hematocrit 40.5 37.5 - 51.0 %    MCV 86.2 79.0 - 97.0 fL    MCH 30.6 26.6 - 33.0 pg    MCHC 35.6 31.5 - 35.7 g/dL    RDW 12.1 (L) 12.3 - 15.4 %    RDW-SD 38.5 37.0 - 54.0 fl    MPV 9.8 6.0 - 12.0 fL    Platelets 276 140 - 450 10*3/mm3    Neutrophil % 62.2 42.7 - 76.0 %    Lymphocyte % 26.1 19.6 - 45.3 %    Monocyte % 8.0 5.0 - 12.0 %    Eosinophil % 2.4 0.3 - 6.2 %    Basophil % 0.9 0.0 - 1.5 %    Immature Grans % 0.4 0.0 - 0.5 %    Neutrophils, Absolute 4.75 1.70 - 7.00 10*3/mm3    Lymphocytes, Absolute 1.99 0.70 - 3.10 10*3/mm3    Monocytes,  Absolute 0.61 0.10 - 0.90 10*3/mm3    Eosinophils, Absolute 0.18 0.00 - 0.40 10*3/mm3    Basophils, Absolute 0.07 0.00 - 0.20 10*3/mm3    Immature Grans, Absolute 0.03 0.00 - 0.05 10*3/mm3               Assessment and Plan:          Diagnoses and all orders for this visit:    1. Essential hypertension (Primary)  Assessment & Plan:  Recheck bp, keep a log of bp at home, will increase norvasc to 10 mg ( [facility])     Orders:  -     Comprehensive Metabolic Panel; Future  -     Lipid Panel; Future  -     hydroCHLOROthiazide (HYDRODIURIL) 25 MG tablet; Take 1 tablet by mouth Daily.  Dispense: 90 tablet; Refill: 1  -     amLODIPine (Norvasc) 10 MG tablet; Take 1 tablet by mouth Daily.  Dispense: 90 tablet; Refill: 0    2. Other hyperlipidemia  Assessment & Plan:  Reviewed labs, return fasting for labs, continue efforts with diet and regular  Exercise  Advised to get covid booster.           Follow Up   Return for fasting for labs.  Patient was given instructions and counseling regarding his condition or for health maintenance advice. Please see specific information pulled into the AVS if appropriate.

## 2022-04-15 ENCOUNTER — LAB (OUTPATIENT)
Dept: LAB | Facility: HOSPITAL | Age: 52
End: 2022-04-15

## 2022-04-15 DIAGNOSIS — I10 ESSENTIAL HYPERTENSION: ICD-10-CM

## 2022-04-15 LAB
ALBUMIN SERPL-MCNC: 4.9 G/DL (ref 3.5–5.2)
ALBUMIN/GLOB SERPL: 1.7 G/DL
ALP SERPL-CCNC: 49 U/L (ref 39–117)
ALT SERPL W P-5'-P-CCNC: 18 U/L (ref 1–41)
ANION GAP SERPL CALCULATED.3IONS-SCNC: 9.3 MMOL/L (ref 5–15)
AST SERPL-CCNC: 17 U/L (ref 1–40)
BILIRUB SERPL-MCNC: 0.5 MG/DL (ref 0–1.2)
BUN SERPL-MCNC: 14 MG/DL (ref 6–20)
BUN/CREAT SERPL: 13 (ref 7–25)
CALCIUM SPEC-SCNC: 10 MG/DL (ref 8.6–10.5)
CHLORIDE SERPL-SCNC: 101 MMOL/L (ref 98–107)
CHOLEST SERPL-MCNC: 214 MG/DL (ref 0–200)
CO2 SERPL-SCNC: 27.7 MMOL/L (ref 22–29)
CREAT SERPL-MCNC: 1.08 MG/DL (ref 0.76–1.27)
EGFRCR SERPLBLD CKD-EPI 2021: 81.5 ML/MIN/1.73
GLOBULIN UR ELPH-MCNC: 2.9 GM/DL
GLUCOSE SERPL-MCNC: 87 MG/DL (ref 65–99)
HDLC SERPL-MCNC: 43 MG/DL (ref 40–60)
LDLC SERPL CALC-MCNC: 152 MG/DL (ref 0–100)
LDLC/HDLC SERPL: 3.48 {RATIO}
POTASSIUM SERPL-SCNC: 3.6 MMOL/L (ref 3.5–5.2)
PROT SERPL-MCNC: 7.8 G/DL (ref 6–8.5)
SODIUM SERPL-SCNC: 138 MMOL/L (ref 136–145)
TRIGL SERPL-MCNC: 106 MG/DL (ref 0–150)
VLDLC SERPL-MCNC: 19 MG/DL (ref 5–40)

## 2022-04-15 PROCEDURE — 80053 COMPREHEN METABOLIC PANEL: CPT

## 2022-04-15 PROCEDURE — 80061 LIPID PANEL: CPT

## 2022-04-15 PROCEDURE — 36415 COLL VENOUS BLD VENIPUNCTURE: CPT

## 2022-07-02 DIAGNOSIS — I10 ESSENTIAL HYPERTENSION: ICD-10-CM

## 2022-07-05 RX ORDER — AMLODIPINE BESYLATE 10 MG/1
10 TABLET ORAL DAILY
Qty: 90 TABLET | Refills: 0 | Status: SHIPPED | OUTPATIENT
Start: 2022-07-05 | End: 2022-10-17 | Stop reason: SDUPTHER

## 2022-10-17 ENCOUNTER — OFFICE VISIT (OUTPATIENT)
Dept: FAMILY MEDICINE CLINIC | Age: 52
End: 2022-10-17

## 2022-10-17 VITALS
SYSTOLIC BLOOD PRESSURE: 144 MMHG | DIASTOLIC BLOOD PRESSURE: 81 MMHG | WEIGHT: 238.4 LBS | BODY MASS INDEX: 33.38 KG/M2 | HEART RATE: 73 BPM | HEIGHT: 71 IN | RESPIRATION RATE: 18 BRPM

## 2022-10-17 DIAGNOSIS — I10 ESSENTIAL HYPERTENSION: Primary | ICD-10-CM

## 2022-10-17 DIAGNOSIS — E78.49 OTHER HYPERLIPIDEMIA: ICD-10-CM

## 2022-10-17 PROCEDURE — 99213 OFFICE O/P EST LOW 20 MIN: CPT | Performed by: NURSE PRACTITIONER

## 2022-10-17 RX ORDER — AMLODIPINE BESYLATE 10 MG/1
10 TABLET ORAL DAILY
Qty: 90 TABLET | Refills: 0 | Status: SHIPPED | OUTPATIENT
Start: 2022-10-17 | End: 2023-01-25 | Stop reason: SDUPTHER

## 2022-10-17 NOTE — PROGRESS NOTES
Jim Sparks presents to [facility].    Chief Complaint:  Hypertension and Follow-up (6 month)         History of Present Illness:  Hypertension:  Current medication: HCTZ/ Norvasc   Tolerating Medication: Yes  Checking BP at home and it is: not checking   Needs refills: Yes, needs norvasc, [facility]   Labs:  Lab Results       Component                Value               Date                       GLUCOSE                  87                  04/15/2022                 BUN                      14                  04/15/2022                 CREATININE               1.08                04/15/2022                 EGFRIFNONA               72                  09/01/2021                 BCR                      13.0                04/15/2022                 K                        3.6                 04/15/2022                 CO2                      27.7                04/15/2022                 CALCIUM                  10.0                04/15/2022                 ALBUMIN                  4.90                04/15/2022                 LABIL2                   1.6                 08/05/2019                 AST                      17                  04/15/2022                 ALT                      18                  04/15/2022              Hyperlipidemia  Current medication: none  Had been working on diet and exercise but due to his work schedule not doing as well   Lab Results       Component                Value               Date                       CHOL                     214 (H)             04/15/2022                 CHLPL                    166                 08/05/2019                 TRIG                     106                 04/15/2022                 HDL                      43                  04/15/2022                 LDL                      152 (H)             04/15/2022                PAST MEDICAL HISTORY changes since 4-2022:         Covid + 9-2022, mild  "symptoms     Hypertension         PREVENTIVE HEALTH MAINTENANCE             INFLUENZA VACCINE: was last done 2016         Surgical History:     NONE         Family History:     Father: Hypertension;  Type 2 Diabetes, renal failure, pacemaker     Mother: Hypertension     Brother(s): 3 brother(s) total; 2 ;  Arrhythmia (  at 55 ); Hypertension;  Type 1 Diabetes (  at 50 )     Sister(s): Healthy; 1 sister(s) total     Paternal Grandfather:  at age 70's;  Alcoholism     Paternal Grandmother:  at age 80's     Maternal Grandfather:  at age 30; Cause of death was CVA     Maternal Grandmother:  at age 70's; Cause of death was MI;  Cancer (unspecified type)         Social History:     Occupation: [employer]     Marital Status:      Children: 1 child         Review of Systems:  Review of Systems   Constitutional: Negative for fatigue and fever.   Respiratory: Positive for cough (occ cough ). Negative for shortness of breath.    Cardiovascular: Negative for chest pain, palpitations and leg swelling.   Neurological: Negative for numbness.          Current Outpatient Medications:   •  amLODIPine (NORVASC) 10 MG tablet, Take 1 tablet by mouth Daily., Disp: 90 tablet, Rfl: 0  •  hydroCHLOROthiazide (HYDRODIURIL) 25 MG tablet, Take 1 tablet by mouth Daily., Disp: 90 tablet, Rfl: 1    Vital Signs:   Vitals:    10/17/22 1134   BP: 144/81   BP Location: Left arm   Patient Position: Sitting   Cuff Size: Large Adult   Pulse: 73   Resp: 18   Weight: 108 kg (238 lb 6.4 oz)   Height: 180.3 cm (71\")   PainSc: 0-No pain         Physical Exam:  Physical Exam  Vitals reviewed.   Constitutional:       General: He is not in acute distress.     Appearance: Normal appearance.   Neck:      Vascular: No carotid bruit.   Cardiovascular:      Rate and Rhythm: Normal rate and regular rhythm.      Heart sounds: Normal heart sounds. No murmur heard.  Pulmonary:      Effort: Pulmonary effort is normal. No respiratory " distress.      Breath sounds: Normal breath sounds.   Musculoskeletal:      Right lower leg: No edema.      Left lower leg: No edema.   Neurological:      Mental Status: He is alert.   Psychiatric:         Mood and Affect: Mood normal.         Behavior: Behavior normal.         Result Review      The following data was reviewed by: TRISTIN Marino on 10/17/2022:    Results for orders placed or performed in visit on 04/15/22   Lipid Panel    Specimen: Blood   Result Value Ref Range    Total Cholesterol 214 (H) 0 - 200 mg/dL    Triglycerides 106 0 - 150 mg/dL    HDL Cholesterol 43 40 - 60 mg/dL    LDL Cholesterol  152 (H) 0 - 100 mg/dL    VLDL Cholesterol 19 5 - 40 mg/dL    LDL/HDL Ratio 3.48    Comprehensive Metabolic Panel    Specimen: Blood   Result Value Ref Range    Glucose 87 65 - 99 mg/dL    BUN 14 6 - 20 mg/dL    Creatinine 1.08 0.76 - 1.27 mg/dL    Sodium 138 136 - 145 mmol/L    Potassium 3.6 3.5 - 5.2 mmol/L    Chloride 101 98 - 107 mmol/L    CO2 27.7 22.0 - 29.0 mmol/L    Calcium 10.0 8.6 - 10.5 mg/dL    Total Protein 7.8 6.0 - 8.5 g/dL    Albumin 4.90 3.50 - 5.20 g/dL    ALT (SGPT) 18 1 - 41 U/L    AST (SGOT) 17 1 - 40 U/L    Alkaline Phosphatase 49 39 - 117 U/L    Total Bilirubin 0.5 0.0 - 1.2 mg/dL    Globulin 2.9 gm/dL    A/G Ratio 1.7 g/dL    BUN/Creatinine Ratio 13.0 7.0 - 25.0    Anion Gap 9.3 5.0 - 15.0 mmol/L    eGFR 81.5 >60.0 mL/min/1.73               Assessment and Plan:          Diagnoses and all orders for this visit:    1. Essential hypertension (Primary)  Assessment & Plan:  Hypertension is stable.  to monitor BP at home. Continue current meds. Continue to modify diet and lifestyle. Will need to return fasting for labs, he thinks he can get back in here within the next 30 days.   Advised covid booster and flu vaccines, declined     Orders:  -     Comprehensive Metabolic Panel; Future  -     Lipid Panel; Future  -     amLODIPine (NORVASC) 10 MG tablet; Take 1 tablet by mouth Daily.   Dispense: 90 tablet; Refill: 0    2. Other hyperlipidemia  Assessment & Plan:  Work on diet and exercise, reviewed his previous labs     Orders:  -     Comprehensive Metabolic Panel; Future  -     Lipid Panel; Future        Follow Up   Return for fasting for labs.  Patient was given instructions and counseling regarding his condition or for health maintenance advice. Please see specific information pulled into the AVS if appropriate.

## 2022-10-17 NOTE — ASSESSMENT & PLAN NOTE
Hypertension is stable.  to monitor BP at home. Continue current meds. Continue to modify diet and lifestyle. Will need to return fasting for labs, he thinks he can get back in here within the next 30 days.   Advised covid booster and flu vaccines, declined

## 2022-11-22 ENCOUNTER — TELEPHONE (OUTPATIENT)
Dept: FAMILY MEDICINE CLINIC | Age: 52
End: 2022-11-22

## 2022-11-22 NOTE — TELEPHONE ENCOUNTER
Pt inf of labs of f/u labs due. 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

## 2022-12-22 ENCOUNTER — TELEPHONE (OUTPATIENT)
Dept: FAMILY MEDICINE CLINIC | Age: 52
End: 2022-12-22

## 2022-12-22 NOTE — TELEPHONE ENCOUNTER
Spoke with pt regarding overdue lab orders, ordered on 10/17/22. Pt states that he will be in within the next month to complete.

## 2023-01-25 DIAGNOSIS — I10 ESSENTIAL HYPERTENSION: ICD-10-CM

## 2023-01-25 RX ORDER — AMLODIPINE BESYLATE 10 MG/1
10 TABLET ORAL DAILY
Qty: 90 TABLET | OUTPATIENT
Start: 2023-01-25

## 2023-01-25 RX ORDER — AMLODIPINE BESYLATE 10 MG/1
10 TABLET ORAL DAILY
Qty: 30 TABLET | Refills: 0 | Status: SHIPPED | OUTPATIENT
Start: 2023-01-25 | End: 2023-03-02 | Stop reason: SDUPTHER

## 2023-01-25 RX ORDER — HYDROCHLOROTHIAZIDE 25 MG/1
25 TABLET ORAL DAILY
Qty: 30 TABLET | Refills: 0 | Status: SHIPPED | OUTPATIENT
Start: 2023-01-25 | End: 2023-03-02 | Stop reason: SDUPTHER

## 2023-01-25 NOTE — TELEPHONE ENCOUNTER
Rx Refill Note  Requested Prescriptions     Pending Prescriptions Disp Refills   • hydroCHLOROthiazide (HYDRODIURIL) 25 MG tablet 90 tablet 1     Sig: Take 1 tablet by mouth Daily.   • amLODIPine (NORVASC) 10 MG tablet 90 tablet 0     Sig: Take 1 tablet by mouth Daily.      Last office visit with prescribing clinician: 10/17/2022   Last telemedicine visit with prescribing clinician: 4/17/2023   Next office visit with prescribing clinician: 4/17/2023     Hillary Coppola LPN  01/25/23, 10:49 EST     HCTZ LF-4/7/22 #90, RF-1  NORVASC LF-10/17/22 #90, RF-0    DID NOT MEET PROTOCOL:    BP under 140/90 in past year    Comprehensive Metabolic Panel (04/15/2022 14:49)

## 2023-01-25 NOTE — TELEPHONE ENCOUNTER
Give him 30 days, I saw him in October, and placed orders, he was to get fasting labs, let him know

## 2023-03-02 ENCOUNTER — LAB (OUTPATIENT)
Dept: LAB | Facility: HOSPITAL | Age: 53
End: 2023-03-02
Payer: COMMERCIAL

## 2023-03-02 DIAGNOSIS — I10 ESSENTIAL HYPERTENSION: ICD-10-CM

## 2023-03-02 DIAGNOSIS — E78.49 OTHER HYPERLIPIDEMIA: ICD-10-CM

## 2023-03-02 LAB
ALBUMIN SERPL-MCNC: 4.4 G/DL (ref 3.5–5.2)
ALBUMIN/GLOB SERPL: 1.6 G/DL
ALP SERPL-CCNC: 52 U/L (ref 39–117)
ALT SERPL W P-5'-P-CCNC: 19 U/L (ref 1–41)
ANION GAP SERPL CALCULATED.3IONS-SCNC: 9 MMOL/L (ref 5–15)
AST SERPL-CCNC: 18 U/L (ref 1–40)
BILIRUB SERPL-MCNC: 0.3 MG/DL (ref 0–1.2)
BUN SERPL-MCNC: 18 MG/DL (ref 6–20)
BUN/CREAT SERPL: 15.9 (ref 7–25)
CALCIUM SPEC-SCNC: 9.7 MG/DL (ref 8.6–10.5)
CHLORIDE SERPL-SCNC: 103 MMOL/L (ref 98–107)
CHOLEST SERPL-MCNC: 211 MG/DL (ref 0–200)
CO2 SERPL-SCNC: 26 MMOL/L (ref 22–29)
CREAT SERPL-MCNC: 1.13 MG/DL (ref 0.76–1.27)
EGFRCR SERPLBLD CKD-EPI 2021: 76.8 ML/MIN/1.73
GLOBULIN UR ELPH-MCNC: 2.7 GM/DL
GLUCOSE SERPL-MCNC: 109 MG/DL (ref 65–99)
HDLC SERPL-MCNC: 36 MG/DL (ref 40–60)
LDLC SERPL CALC-MCNC: 161 MG/DL (ref 0–100)
LDLC/HDLC SERPL: 4.43 {RATIO}
POTASSIUM SERPL-SCNC: 4 MMOL/L (ref 3.5–5.2)
PROT SERPL-MCNC: 7.1 G/DL (ref 6–8.5)
SODIUM SERPL-SCNC: 138 MMOL/L (ref 136–145)
TRIGL SERPL-MCNC: 77 MG/DL (ref 0–150)
VLDLC SERPL-MCNC: 14 MG/DL (ref 5–40)

## 2023-03-02 PROCEDURE — 80061 LIPID PANEL: CPT

## 2023-03-02 PROCEDURE — 80053 COMPREHEN METABOLIC PANEL: CPT

## 2023-03-02 PROCEDURE — 36415 COLL VENOUS BLD VENIPUNCTURE: CPT

## 2023-03-02 RX ORDER — HYDROCHLOROTHIAZIDE 25 MG/1
25 TABLET ORAL DAILY
Qty: 30 TABLET | Refills: 0 | Status: SHIPPED | OUTPATIENT
Start: 2023-03-02 | End: 2023-03-03

## 2023-03-02 RX ORDER — AMLODIPINE BESYLATE 10 MG/1
10 TABLET ORAL DAILY
Qty: 30 TABLET | Refills: 0 | Status: SHIPPED | OUTPATIENT
Start: 2023-03-02 | End: 2023-03-03

## 2023-03-02 NOTE — TELEPHONE ENCOUNTER
Rx Refill Note  Requested Prescriptions     Pending Prescriptions Disp Refills   • hydroCHLOROthiazide (HYDRODIURIL) 25 MG tablet 30 tablet 0     Sig: Take 1 tablet by mouth Daily.   • amLODIPine (NORVASC) 10 MG tablet 30 tablet 0     Sig: Take 1 tablet by mouth Daily.      Last office visit with prescribing clinician: 10/17/2022   Last telemedicine visit with prescribing clinician: 4/17/2023   Next office visit with prescribing clinician: 4/17/2023  Last refill sent: 01/25/23, #30    Mita Mike LPN  03/02/23, 08:38 EST

## 2023-03-03 RX ORDER — HYDROCHLOROTHIAZIDE 25 MG/1
25 TABLET ORAL DAILY
Qty: 90 TABLET | Refills: 0 | Status: SHIPPED | OUTPATIENT
Start: 2023-03-03

## 2023-03-03 RX ORDER — HYDROCHLOROTHIAZIDE 25 MG/1
25 TABLET ORAL DAILY
Qty: 30 TABLET | Refills: 0 | Status: SHIPPED | OUTPATIENT
Start: 2023-03-03 | End: 2023-03-03 | Stop reason: SDUPTHER

## 2023-03-03 RX ORDER — AMLODIPINE BESYLATE 10 MG/1
10 TABLET ORAL DAILY
Qty: 90 TABLET | Refills: 0 | Status: SHIPPED | OUTPATIENT
Start: 2023-03-03

## 2023-03-03 RX ORDER — AMLODIPINE BESYLATE 10 MG/1
10 TABLET ORAL DAILY
Qty: 30 TABLET | Refills: 0 | Status: SHIPPED | OUTPATIENT
Start: 2023-03-03 | End: 2023-03-03 | Stop reason: SDUPTHER

## 2023-03-03 NOTE — TELEPHONE ENCOUNTER
Wife called and said meds need [facility]. Called and cancelled them at [facility] and resent to [facility].  Told to remove [facility] from his list to prevent further confusion.  Pt is needing #90 instead of #30.

## 2023-04-17 ENCOUNTER — OFFICE VISIT (OUTPATIENT)
Dept: FAMILY MEDICINE CLINIC | Age: 53
End: 2023-04-17
Payer: COMMERCIAL

## 2023-04-17 VITALS
HEART RATE: 74 BPM | SYSTOLIC BLOOD PRESSURE: 139 MMHG | DIASTOLIC BLOOD PRESSURE: 88 MMHG | BODY MASS INDEX: 34.52 KG/M2 | WEIGHT: 246.6 LBS | HEIGHT: 71 IN | RESPIRATION RATE: 16 BRPM

## 2023-04-17 DIAGNOSIS — Z12.11 SCREEN FOR COLON CANCER: ICD-10-CM

## 2023-04-17 DIAGNOSIS — Z11.59 SCREENING FOR VIRAL DISEASE: ICD-10-CM

## 2023-04-17 DIAGNOSIS — E78.49 OTHER HYPERLIPIDEMIA: ICD-10-CM

## 2023-04-17 DIAGNOSIS — I10 ESSENTIAL HYPERTENSION: ICD-10-CM

## 2023-04-17 DIAGNOSIS — Z12.5 SCREENING FOR PROSTATE CANCER: ICD-10-CM

## 2023-04-17 DIAGNOSIS — Z00.00 ROUTINE GENERAL MEDICAL EXAMINATION AT A HEALTH CARE FACILITY: Primary | ICD-10-CM

## 2023-04-17 PROCEDURE — 99396 PREV VISIT EST AGE 40-64: CPT | Performed by: NURSE PRACTITIONER

## 2023-04-17 NOTE — ASSESSMENT & PLAN NOTE
He will let me know when he needs his refills. Hypertension is stable. to monitor BP at home. Continue current meds. Continue to modify diet and lifestyle.

## 2023-04-17 NOTE — ASSESSMENT & PLAN NOTE
Reviewed labs done 3-2-23 and last year, to continue his recent dietary changes, work on exercise

## 2023-04-17 NOTE — ASSESSMENT & PLAN NOTE
Follow a healthy diet and get regular exercise.  Always wear seat belts.    To check with his insurance/ pharmacy on Tdap vaccine

## 2023-04-17 NOTE — PROGRESS NOTES
Jim Sparks presents to [facility].    Chief Complaint:  Annual Exam         History of Present Illness:  General Health Questions  Regular exercise as least 3 days a week: tries to get 6413-6793 steps a day   Follows a healthy diet: trying   Wears seat belt always: yes   Drinks Alcohol: rarely   Uses Recreational drugs: none   Uses tobacco products: none   UTD on Tetanus vaccine: > 10 years   Last PSA: 8-2019 normal   Last colon screen: never done, will get scheduled when he stops going to [place]     Hypertension:  Current medication: HCTZ/ norvasc  Tolerating Medication: Yes  Checking BP at home and it is: not checking   Needs refills: may need/walgrtannas  Labs:  Lab Results       Component                Value               Date                       GLUCOSE                  109 (H)             03/02/2023                 BUN                      18                  03/02/2023                 CREATININE               1.13                03/02/2023                 EGFRIFNONA               72                  09/01/2021                 BCR                      15.9                03/02/2023                 K                        4.0                 03/02/2023                 CO2                      26.0                03/02/2023                 CALCIUM                  9.7                 03/02/2023                 ALBUMIN                  4.4                 03/02/2023                 LABIL2                   1.6                 08/05/2019                 AST                      18                  03/02/2023                 ALT                      19                  03/02/2023              Hyperlipidemia  Current medication: none   Started on low carb diet  Lab Results       Component                Value               Date                       CHOL                     211 (H)             03/02/2023                 CHLPL                    166                 08/05/2019                  TRIG                     77                  2023                 HDL                      36 (L)              2023                 LDL                      161 (H)             2023                PAST MEDICAL HISTORY changes since :         Covid + , mild symptoms     Hypertension         PREVENTIVE HEALTH MAINTENANCE             INFLUENZA VACCINE: was last done 2016         Surgical History:     NONE         Family History:     Father: Hypertension;  Type 2 Diabetes, renal failure, pacemaker     Mother: Hypertension     Brother(s): 3 brother(s) total; 2 ;  Arrhythmia (  at 55 ); Hypertension;  Type 1 Diabetes (  at 50 )     Sister(s): Healthy; 1 sister(s) total     Paternal Grandfather:  at age 70's;  Alcoholism     Paternal Grandmother:  at age 80's     Maternal Grandfather:  at age 30; Cause of death was CVA     Maternal Grandmother:  at age 70's; Cause of death was MI;  Cancer (unspecified type)         Social History:     Occupation: [employer] /currently work in [place]     Marital Status:      Children: 1 child       Review of Systems:  Review of Systems   Constitutional: Negative for fatigue and fever.   HENT: Negative for ear pain and sore throat.    Eyes: Negative for blurred vision.   Respiratory: Negative for cough and shortness of breath.    Cardiovascular: Negative for chest pain, palpitations and leg swelling.   Gastrointestinal: Negative for abdominal pain, constipation, diarrhea, nausea and vomiting.   Genitourinary: Negative for difficulty urinating.   Musculoskeletal: Negative for arthralgias and myalgias.   Skin: Negative for rash.   Neurological: Negative for dizziness, weakness and headache.   Psychiatric/Behavioral: Negative for sleep disturbance and depressed mood.          Current Outpatient Medications:   •  amLODIPine (NORVASC) 10 MG tablet, Take 1 tablet by mouth Daily., Disp: 90 tablet, Rfl: 0  •   "hydroCHLOROthiazide (HYDRODIURIL) 25 MG tablet, Take 1 tablet by mouth Daily., Disp: 90 tablet, Rfl: 0    Vital Signs:   Vitals:    04/17/23 1620 04/17/23 1642   BP: 140/86 139/88   BP Location: Right arm Right arm   Patient Position: Sitting Sitting   Cuff Size: Large Adult Large Adult   Pulse: 79 74   Resp: 16    Weight: 112 kg (246 lb 9.6 oz)    Height: 180.3 cm (71\")    PainSc: 0-No pain          Physical Exam:  Physical Exam  Vitals reviewed.   Constitutional:       Appearance: Normal appearance. He is well-developed.   HENT:      Head: Normocephalic and atraumatic.      Right Ear: External ear normal.      Left Ear: External ear normal.      Mouth/Throat:      Pharynx: No oropharyngeal exudate.   Eyes:      Conjunctiva/sclera: Conjunctivae normal.      Pupils: Pupils are equal, round, and reactive to light.   Cardiovascular:      Rate and Rhythm: Normal rate and regular rhythm.      Heart sounds: No murmur heard.    No friction rub. No gallop.   Pulmonary:      Effort: Pulmonary effort is normal.      Breath sounds: Normal breath sounds. No wheezing or rhonchi.   Abdominal:      General: Bowel sounds are normal. There is no distension.      Palpations: Abdomen is soft.      Tenderness: There is no abdominal tenderness.   Skin:     General: Skin is warm and dry.   Neurological:      Mental Status: He is alert and oriented to person, place, and time.      Cranial Nerves: No cranial nerve deficit.   Psychiatric:         Mood and Affect: Mood and affect normal.         Behavior: Behavior normal.         Thought Content: Thought content normal.         Judgment: Judgment normal.         Result Review      The following data was reviewed by: TRISTIN Marino on 04/17/2023:    Results for orders placed or performed in visit on 03/02/23   Comprehensive Metabolic Panel    Specimen: Blood   Result Value Ref Range    Glucose 109 (H) 65 - 99 mg/dL    BUN 18 6 - 20 mg/dL    Creatinine 1.13 0.76 - 1.27 mg/dL    " Sodium 138 136 - 145 mmol/L    Potassium 4.0 3.5 - 5.2 mmol/L    Chloride 103 98 - 107 mmol/L    CO2 26.0 22.0 - 29.0 mmol/L    Calcium 9.7 8.6 - 10.5 mg/dL    Total Protein 7.1 6.0 - 8.5 g/dL    Albumin 4.4 3.5 - 5.2 g/dL    ALT (SGPT) 19 1 - 41 U/L    AST (SGOT) 18 1 - 40 U/L    Alkaline Phosphatase 52 39 - 117 U/L    Total Bilirubin 0.3 0.0 - 1.2 mg/dL    Globulin 2.7 gm/dL    A/G Ratio 1.6 g/dL    BUN/Creatinine Ratio 15.9 7.0 - 25.0    Anion Gap 9.0 5.0 - 15.0 mmol/L    eGFR 76.8 >60.0 mL/min/1.73   Lipid Panel    Specimen: Blood   Result Value Ref Range    Total Cholesterol 211 (H) 0 - 200 mg/dL    Triglycerides 77 0 - 150 mg/dL    HDL Cholesterol 36 (L) 40 - 60 mg/dL    LDL Cholesterol  161 (H) 0 - 100 mg/dL    VLDL Cholesterol 14 5 - 40 mg/dL    LDL/HDL Ratio 4.43                Assessment and Plan:          Diagnoses and all orders for this visit:    1. Routine general medical examination at a health care facility (Primary)  Assessment & Plan:  Follow a healthy diet and get regular exercise.  Always wear seat belts.    To check with his insurance/ pharmacy on Tdap vaccine     Orders:  -     Comprehensive Metabolic Panel; Future  -     CBC & Differential; Future  -     TSH; Future  -     Lipid Panel; Future  -     PSA Screen; Future    2. Essential hypertension  Assessment & Plan:  He will let me know when he needs his refills. Hypertension is stable. to monitor BP at home. Continue current meds. Continue to modify diet and lifestyle.    Orders:  -     Comprehensive Metabolic Panel; Future    3. Other hyperlipidemia  Assessment & Plan:  Reviewed labs done 3-2-23 and last year, to continue his recent dietary changes, work on exercise     Orders:  -     Lipid Panel; Future    4. Screening for viral disease  Assessment & Plan:  Will screen for hep c when he comes in for the follow up labs in June 2023 (he will be finished then working in [place], which he travels to weekly)     Orders:  -     Hepatitis C  antibody; Future    5. Screen for colon cancer  Assessment & Plan:  He will let me know when he is back from [place] to get a screening CLN set up and where, he thinks he wants to have at [facility]         6. Screening for prostate cancer  Assessment & Plan:  Will screen with a PSA     Orders:  -     PSA Screen; Future        Follow Up   Return for fasting for labs in early June 2023.  Patient was given instructions and counseling regarding his condition or for health maintenance advice. Please see specific information pulled into the AVS if appropriate.

## 2023-04-17 NOTE — ASSESSMENT & PLAN NOTE
Will screen for hep c when he comes in for the follow up labs in June 2023 (he will be finished then working in [place], which he travels to weekly)

## 2023-04-17 NOTE — ASSESSMENT & PLAN NOTE
He will let me know when he is back from [place] to get a screening CLN set up and where, he thinks he wants to have at [facility]

## 2023-05-02 DIAGNOSIS — I10 ESSENTIAL HYPERTENSION: ICD-10-CM

## 2023-05-02 RX ORDER — AMLODIPINE BESYLATE 10 MG/1
10 TABLET ORAL DAILY
Qty: 90 TABLET | Refills: 0 | Status: SHIPPED | OUTPATIENT
Start: 2023-05-02

## 2023-05-02 NOTE — TELEPHONE ENCOUNTER
"    Caller: Jim Sparks \"Brendon\"    Relationship: Self    Best call back number: [phone]    Requested Prescriptions:   Requested Prescriptions     Pending Prescriptions Disp Refills   • amLODIPine (NORVASC) 10 MG tablet 90 tablet 0     Sig: Take 1 tablet by mouth Daily.        Pharmacy where request should be sent: [id number] - [place], [place] - [address] AT [facility] [address] [facility] - [phone] [facility] [phone] FX     Last office visit with prescribing clinician: 4/17/2023   Last telemedicine visit with prescribing clinician: 10/18/2023   Next office visit with prescribing clinician: 10/18/2023     Additional details provided by patient: LESS THAN THREE DAY SUPPLY ON HAND.      Does the patient have less than a 3 day supply:  [x] Yes  [] No    Would you like a call back once the refill request has been completed: [x] Yes [] No    If the office needs to give you a call back, can they leave a voicemail: [x] Yes [] No    Jordan Cano Rep   05/02/23 15:51 EDT         "

## 2023-06-03 DIAGNOSIS — I10 ESSENTIAL HYPERTENSION: ICD-10-CM

## 2023-06-05 RX ORDER — HYDROCHLOROTHIAZIDE 25 MG/1
25 TABLET ORAL DAILY
Qty: 90 TABLET | Refills: 0 | Status: SHIPPED | OUTPATIENT
Start: 2023-06-05

## 2023-11-13 ENCOUNTER — TELEPHONE (OUTPATIENT)
Dept: FAMILY MEDICINE CLINIC | Age: 53
End: 2023-11-13
Payer: COMMERCIAL

## 2023-11-13 NOTE — TELEPHONE ENCOUNTER
[employer] message sent regarding over due labs ord by pcp 4/17/23, due 10/14/23-1st attempt

## 2023-11-20 NOTE — TELEPHONE ENCOUNTER
Spoke with pt who stated they changed insurances from [employer] to [employer] due to [facility] not taking [employer] and they wanted to continue service here. Pt will come in to get labs and call to schedule f/u appt. Will postpone.-2nd attempt

## 2023-12-02 DIAGNOSIS — I10 ESSENTIAL HYPERTENSION: ICD-10-CM

## 2023-12-04 DIAGNOSIS — I10 ESSENTIAL HYPERTENSION: ICD-10-CM

## 2023-12-04 RX ORDER — HYDROCHLOROTHIAZIDE 25 MG/1
25 TABLET ORAL DAILY
Qty: 90 TABLET | Refills: 0 | Status: SHIPPED | OUTPATIENT
Start: 2023-12-04

## 2023-12-04 RX ORDER — AMLODIPINE BESYLATE 10 MG/1
10 TABLET ORAL DAILY
Qty: 90 TABLET | Refills: 0 | Status: SHIPPED | OUTPATIENT
Start: 2023-12-04

## 2023-12-11 ENCOUNTER — OFFICE VISIT (OUTPATIENT)
Dept: FAMILY MEDICINE CLINIC | Age: 53
End: 2023-12-11
Payer: COMMERCIAL

## 2023-12-11 VITALS
HEIGHT: 71 IN | SYSTOLIC BLOOD PRESSURE: 156 MMHG | BODY MASS INDEX: 34.69 KG/M2 | DIASTOLIC BLOOD PRESSURE: 86 MMHG | HEART RATE: 77 BPM | WEIGHT: 247.8 LBS

## 2023-12-11 DIAGNOSIS — E78.49 OTHER HYPERLIPIDEMIA: ICD-10-CM

## 2023-12-11 DIAGNOSIS — I10 ESSENTIAL HYPERTENSION: Primary | ICD-10-CM

## 2023-12-11 PROCEDURE — 99213 OFFICE O/P EST LOW 20 MIN: CPT | Performed by: NURSE PRACTITIONER

## 2023-12-11 NOTE — ASSESSMENT & PLAN NOTE
continue current rx's, Repeated bp, he will return fasting within the next 2 weeks, log sheet given to monitor his bp, drop off the log, continue his new efforts with diet and regular exercise, consider adding ACE or ARB.

## 2023-12-11 NOTE — PROGRESS NOTES
Chief Complaint  Hypertension (Check up)    Subjective          Jim Sparks presents to [facility]    History of Present Illness  Hypertension:  Current medication: HCTZ and Norvasc   Tolerating Medication: Yes  Started a diet a week ago   Checking BP at home and it is: not checking   Needs refills: No, just got a 90 day supply, was having to take QOD for a short time so he would not run out   Labs:  Lab Results       Component                Value               Date                       GLUCOSE                  109 (H)             03/02/2023                 BUN                      18                  03/02/2023                 CREATININE               1.13                03/02/2023                 EGFRIFNONA               72                  09/01/2021                 BCR                      15.9                03/02/2023                 K                        4.0                 03/02/2023                 CO2                      26.0                03/02/2023                 CALCIUM                  9.7                 03/02/2023                 ALBUMIN                  4.4                 03/02/2023                 LABIL2                   1.6                 08/05/2019                 AST                      18                  03/02/2023                 ALT                      19                  03/02/2023              Hyperlipidemia  Current medication: none     Lab Results       Component                Value               Date                       CHOL                     211 (H)             03/02/2023                 CHLPL                    166                 08/05/2019                 TRIG                     77                  03/02/2023                 HDL                      36 (L)              03/02/2023                 LDL                      161 (H)             03/02/2023              PAST MEDICAL HISTORY changes since 4-2023:         Covid +  , mild symptoms     Hypertension         PREVENTIVE HEALTH MAINTENANCE                   Surgical History:     NONE         Family History:     Father: Hypertension;  Type 2 Diabetes, renal failure, pacemaker     Mother: Hypertension     Brother(s): 3 brother(s) total; 2 ;  Arrhythmia (  at 55 ); Hypertension;  Type 1 Diabetes (  at 50 )     Sister(s): Healthy; 1 sister(s) total     Paternal Grandfather:  at age 70's;  Alcoholism     Paternal Grandmother:  at age 80's     Maternal Grandfather:  at age 30; Cause of death was CVA     Maternal Grandmother:  at age 70's; Cause of death was MI;  Cancer (unspecified type)         Social History:     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 No pertinent surgical history.     Social History     Tobacco Use    Smoking status: Never    Smokeless tobacco: Never   Substance Use Topics    Alcohol use: Not Currently       History reviewed. No pertinent family history.     Health Maintenance Due   Topic Date Due    BMI FOLLOWUP  Never done    COLORECTAL CANCER SCREENING  Never done    TDAP/TD VACCINES (1 - Tdap) Never done    ZOSTER VACCINE (1 of 2) Never done    HEPATITIS C SCREENING  Never done    INFLUENZA VACCINE  2023    COVID-19 Vaccine (3 - - season) 2023        Current Outpatient Medications on File Prior to Visit   Medication Sig    amLODIPine (NORVASC) 10 MG tablet TAKE 1 TABLET BY MOUTH DAILY    hydroCHLOROthiazide (HYDRODIURIL) 25 MG tablet TAKE 1 TABLET BY MOUTH DAILY     No current facility-administered medications on file prior to visit.       Immunization History   Administered Date(s) Administered    COVID-19 (PFIZER) Purple Cap Monovalent 2021, 2021    Fluzone (or Fluarix & Flulaval for VFC) >6mos 10/04/2021       Review of Systems  "  Constitutional:  Negative for fatigue and fever.   Respiratory:  Negative for cough and shortness of breath.    Cardiovascular:  Negative for chest pain, palpitations and leg swelling.        Objective     Vitals:    12/11/23 1635 12/11/23 1648   BP: 162/100 156/86  Comment: recheck   BP Location: Right arm Left arm   Patient Position: Sitting Sitting   Pulse: 81 77   Weight: 112 kg (247 lb 12.8 oz)    Height: 180.3 cm (71\")             Physical Exam  Vitals reviewed.   Constitutional:       General: He is not in acute distress.     Appearance: Normal appearance.   Neck:      Vascular: No carotid bruit.   Cardiovascular:      Rate and Rhythm: Normal rate and regular rhythm.      Heart sounds: Normal heart sounds. No murmur heard.  Pulmonary:      Effort: Pulmonary effort is normal. No respiratory distress.      Breath sounds: Normal breath sounds.   Musculoskeletal:      Right lower leg: No edema.      Left lower leg: No edema.   Neurological:      Mental Status: He is alert.   Psychiatric:         Mood and Affect: Mood normal.         Behavior: Behavior normal.         Result Review :     The following data was reviewed by: TRISTIN Marino on 12/11/2023:                       Assessment and Plan      Diagnoses and all orders for this visit:    1. Essential hypertension (Primary)  Assessment & Plan:  continue current rx's, Repeated bp, he will return fasting within the next 2 weeks, log sheet given to monitor his bp, drop off the log, continue his new efforts with diet and regular exercise, consider adding ACE or ARB.    Orders:  -     Comprehensive Metabolic Panel; Future  -     Lipid Panel; Future    2. Other hyperlipidemia  Assessment & Plan:  To work on diet and exercise, reviewed last lab   Discussed imm updates, declines any today     Orders:  -     Lipid Panel; Future        BMI is >= 30 and <35. (Class 1 Obesity). The following options were offered after discussion;: exercise " counseling/recommendations and nutrition counseling/recommendations       Follow Up     Return for fasting for labs.    Patient was given instructions and counseling regarding his condition or for health maintenance advice. Please see specific information pulled into the AVS if appropriate.

## 2024-01-24 ENCOUNTER — TELEPHONE (OUTPATIENT)
Dept: FAMILY MEDICINE CLINIC | Age: 54
End: 2024-01-24
Payer: COMMERCIAL

## 2024-02-02 ENCOUNTER — LAB (OUTPATIENT)
Dept: LAB | Facility: HOSPITAL | Age: 54
End: 2024-02-02
Payer: COMMERCIAL

## 2024-02-02 DIAGNOSIS — Z00.00 ROUTINE GENERAL MEDICAL EXAMINATION AT A HEALTH CARE FACILITY: ICD-10-CM

## 2024-02-02 DIAGNOSIS — E78.49 OTHER HYPERLIPIDEMIA: ICD-10-CM

## 2024-02-02 DIAGNOSIS — Z11.59 SCREENING FOR VIRAL DISEASE: ICD-10-CM

## 2024-02-02 DIAGNOSIS — Z12.5 SCREENING FOR PROSTATE CANCER: ICD-10-CM

## 2024-02-02 DIAGNOSIS — I10 ESSENTIAL HYPERTENSION: ICD-10-CM

## 2024-02-02 LAB
ALBUMIN SERPL-MCNC: 4.8 G/DL (ref 3.5–5.2)
ALBUMIN/GLOB SERPL: 1.7 G/DL
ALP SERPL-CCNC: 55 U/L (ref 39–117)
ALT SERPL W P-5'-P-CCNC: 17 U/L (ref 1–41)
ANION GAP SERPL CALCULATED.3IONS-SCNC: 10.3 MMOL/L (ref 5–15)
AST SERPL-CCNC: 22 U/L (ref 1–40)
BASOPHILS # BLD AUTO: 0.04 10*3/MM3 (ref 0–0.2)
BASOPHILS NFR BLD AUTO: 0.4 % (ref 0–1.5)
BILIRUB SERPL-MCNC: 0.4 MG/DL (ref 0–1.2)
BUN SERPL-MCNC: 18 MG/DL (ref 6–20)
BUN/CREAT SERPL: 16.8 (ref 7–25)
CALCIUM SPEC-SCNC: 9.5 MG/DL (ref 8.6–10.5)
CHLORIDE SERPL-SCNC: 100 MMOL/L (ref 98–107)
CHOLEST SERPL-MCNC: 203 MG/DL (ref 0–200)
CO2 SERPL-SCNC: 25.7 MMOL/L (ref 22–29)
CREAT SERPL-MCNC: 1.07 MG/DL (ref 0.76–1.27)
DEPRECATED RDW RBC AUTO: 37.4 FL (ref 37–54)
EGFRCR SERPLBLD CKD-EPI 2021: 81.4 ML/MIN/1.73
EOSINOPHIL # BLD AUTO: 0.18 10*3/MM3 (ref 0–0.4)
EOSINOPHIL NFR BLD AUTO: 1.7 % (ref 0.3–6.2)
ERYTHROCYTE [DISTWIDTH] IN BLOOD BY AUTOMATED COUNT: 11.9 % (ref 12.3–15.4)
GLOBULIN UR ELPH-MCNC: 2.8 GM/DL
GLUCOSE SERPL-MCNC: 80 MG/DL (ref 65–99)
HCT VFR BLD AUTO: 39.7 % (ref 37.5–51)
HCV AB SER DONR QL: NORMAL
HDLC SERPL-MCNC: 40 MG/DL (ref 40–60)
HGB BLD-MCNC: 13.9 G/DL (ref 13–17.7)
IMM GRANULOCYTES # BLD AUTO: 0.02 10*3/MM3 (ref 0–0.05)
IMM GRANULOCYTES NFR BLD AUTO: 0.2 % (ref 0–0.5)
LDLC SERPL CALC-MCNC: 151 MG/DL (ref 0–100)
LDLC/HDLC SERPL: 3.74 {RATIO}
LYMPHOCYTES # BLD AUTO: 2.61 10*3/MM3 (ref 0.7–3.1)
LYMPHOCYTES NFR BLD AUTO: 25.3 % (ref 19.6–45.3)
MCH RBC QN AUTO: 30 PG (ref 26.6–33)
MCHC RBC AUTO-ENTMCNC: 35 G/DL (ref 31.5–35.7)
MCV RBC AUTO: 85.6 FL (ref 79–97)
MONOCYTES # BLD AUTO: 0.77 10*3/MM3 (ref 0.1–0.9)
MONOCYTES NFR BLD AUTO: 7.5 % (ref 5–12)
NEUTROPHILS NFR BLD AUTO: 6.71 10*3/MM3 (ref 1.7–7)
NEUTROPHILS NFR BLD AUTO: 64.9 % (ref 42.7–76)
PLATELET # BLD AUTO: 289 10*3/MM3 (ref 140–450)
PMV BLD AUTO: 9.1 FL (ref 6–12)
POTASSIUM SERPL-SCNC: 3.6 MMOL/L (ref 3.5–5.2)
PROT SERPL-MCNC: 7.6 G/DL (ref 6–8.5)
PSA SERPL-MCNC: 1.13 NG/ML (ref 0–4)
RBC # BLD AUTO: 4.64 10*6/MM3 (ref 4.14–5.8)
SODIUM SERPL-SCNC: 136 MMOL/L (ref 136–145)
TRIGL SERPL-MCNC: 68 MG/DL (ref 0–150)
TSH SERPL DL<=0.05 MIU/L-ACNC: 2.09 UIU/ML (ref 0.27–4.2)
VLDLC SERPL-MCNC: 12 MG/DL (ref 5–40)
WBC NRBC COR # BLD AUTO: 10.33 10*3/MM3 (ref 3.4–10.8)

## 2024-02-02 PROCEDURE — G0103 PSA SCREENING: HCPCS

## 2024-02-02 PROCEDURE — 80061 LIPID PANEL: CPT

## 2024-02-02 PROCEDURE — 86803 HEPATITIS C AB TEST: CPT

## 2024-02-02 PROCEDURE — 36415 COLL VENOUS BLD VENIPUNCTURE: CPT

## 2024-02-02 PROCEDURE — 80050 GENERAL HEALTH PANEL: CPT

## 2024-03-02 DIAGNOSIS — I10 ESSENTIAL HYPERTENSION: ICD-10-CM

## 2024-03-04 RX ORDER — AMLODIPINE BESYLATE 10 MG/1
10 TABLET ORAL DAILY
Qty: 90 TABLET | Refills: 0 | Status: SHIPPED | OUTPATIENT
Start: 2024-03-04

## 2024-04-01 DIAGNOSIS — I10 ESSENTIAL HYPERTENSION: ICD-10-CM

## 2024-04-01 RX ORDER — HYDROCHLOROTHIAZIDE 25 MG/1
25 TABLET ORAL DAILY
Qty: 90 TABLET | Refills: 0 | Status: SHIPPED | OUTPATIENT
Start: 2024-04-01

## 2024-04-01 NOTE — TELEPHONE ENCOUNTER
"     Caller: Jim Sparks \"Brendon\"    Relationship: Self    Best call back number: [phone]    Requested Prescriptions:   Requested Prescriptions     Pending Prescriptions Disp Refills    hydroCHLOROthiazide 25 MG tablet 90 tablet 0     Sig: Take 1 tablet by mouth Daily.        Pharmacy where request should be sent: [facility] #[id number] - [place], [place] - [address] AT [facility] [address]  - [phone]  - [phone] FX     Last office visit with prescribing clinician: 12/11/2023   Last telemedicine visit with prescribing clinician: Visit date not found   Next office visit with prescribing clinician: 6/11/2024     Additional details provided by patient:      Does the patient have less than a 3 day supply:  [] Yes  [x] No    Would you like a call back once the refill request has been completed: [] Yes [x] No    If the office needs to give you a call back, can they leave a voicemail: [] Yes [] No    Jordan Ashford Rep   04/01/24 11:22 EDT          "

## 2024-05-04 ENCOUNTER — TELEPHONE (OUTPATIENT)
Dept: FAMILY MEDICINE CLINIC | Age: 54
End: 2024-05-04
Payer: COMMERCIAL

## 2024-05-04 DIAGNOSIS — I10 ESSENTIAL HYPERTENSION: ICD-10-CM

## 2024-05-04 RX ORDER — AMLODIPINE BESYLATE 10 MG/1
10 TABLET ORAL DAILY
Qty: 10 TABLET | Refills: 0 | Status: SHIPPED | OUTPATIENT
Start: 2024-05-04

## 2024-05-04 RX ORDER — HYDROCHLOROTHIAZIDE 25 MG/1
25 TABLET ORAL DAILY
Qty: 10 TABLET | Refills: 0 | Status: SHIPPED | OUTPATIENT
Start: 2024-05-04

## 2024-05-04 NOTE — TELEPHONE ENCOUNTER
Patient heading to [place] and forgot his medications.  Called in 10-day supply to hold him over until he returns    mas

## 2024-07-12 DIAGNOSIS — I10 ESSENTIAL HYPERTENSION: ICD-10-CM

## 2024-07-12 RX ORDER — AMLODIPINE BESYLATE 10 MG/1
10 TABLET ORAL DAILY
Qty: 10 TABLET | Refills: 0 | Status: SHIPPED | OUTPATIENT
Start: 2024-07-12

## 2024-07-12 RX ORDER — HYDROCHLOROTHIAZIDE 25 MG/1
25 TABLET ORAL DAILY
Qty: 10 TABLET | Refills: 0 | Status: SHIPPED | OUTPATIENT
Start: 2024-07-12

## 2024-07-12 NOTE — TELEPHONE ENCOUNTER
Rx Refill Note  Requested Prescriptions     Pending Prescriptions Disp Refills    hydroCHLOROthiazide 25 MG tablet 10 tablet 0     Sig: Take 1 tablet by mouth Daily.    amLODIPine (NORVASC) 10 MG tablet 10 tablet 0     Sig: Take 1 tablet by mouth Daily.      Last office visit with prescribing clinician: 12/11/2023   Last telemedicine visit with prescribing clinician: Visit date not found   Next office visit with prescribing clinician: 7/16/2024       Hillary Coppola LPN  07/12/24, 16:57 EDT    LF-5/4/24 #10 ON BOTH RX BY DR JOHNSTON, PLEASE ADV.      Comprehensive Metabolic Panel (02/02/2024 13:30)

## 2024-07-12 NOTE — TELEPHONE ENCOUNTER
"Caller: Jim Sparks \"Brendon\"    Relationship: Self    Best call back number:     [phone]        Requested Prescriptions:   Requested Prescriptions     Pending Prescriptions Disp Refills    hydroCHLOROthiazide 25 MG tablet 10 tablet 0     Sig: Take 1 tablet by mouth Daily.    amLODIPine (NORVASC) 10 MG tablet 10 tablet 0     Sig: Take 1 tablet by mouth Daily.        Pharmacy where request should be sent: [facility] #[id number] - [place], [place] - [address] [facility] AT [facility] [address] [facility] - [phone] [facility] [phone] FX     Last office visit with prescribing clinician: 12/11/2023   Last telemedicine visit with prescribing clinician: Visit date not found   Next office visit with prescribing clinician: 7/16/2024     Additional details provided by patient: PATIENT WILL BE OUT OF MEDICATION ON SUNDAY 7.14.2024    Does the patient have less than a 3 day supply:  [x] Yes  [] No    Would you like a call back once the refill request has been completed: [] Yes [x] No    If the office needs to give you a call back, can they leave a voicemail: [] Yes [x] No    Jordan Lee Rep   07/12/24 16:47 EDT       "

## 2024-07-16 ENCOUNTER — OFFICE VISIT (OUTPATIENT)
Dept: FAMILY MEDICINE CLINIC | Age: 54
End: 2024-07-16
Payer: COMMERCIAL

## 2024-07-16 VITALS
HEART RATE: 76 BPM | SYSTOLIC BLOOD PRESSURE: 147 MMHG | WEIGHT: 263.6 LBS | HEIGHT: 71 IN | BODY MASS INDEX: 36.9 KG/M2 | DIASTOLIC BLOOD PRESSURE: 96 MMHG | TEMPERATURE: 98.2 F

## 2024-07-16 DIAGNOSIS — E78.49 OTHER HYPERLIPIDEMIA: ICD-10-CM

## 2024-07-16 DIAGNOSIS — I10 ESSENTIAL HYPERTENSION: Primary | ICD-10-CM

## 2024-07-16 PROCEDURE — 99214 OFFICE O/P EST MOD 30 MIN: CPT | Performed by: NURSE PRACTITIONER

## 2024-07-16 RX ORDER — AMLODIPINE BESYLATE 10 MG/1
10 TABLET ORAL DAILY
Qty: 90 TABLET | Refills: 1 | Status: SHIPPED | OUTPATIENT
Start: 2024-07-16

## 2024-07-16 RX ORDER — HYDROCHLOROTHIAZIDE 25 MG/1
25 TABLET ORAL DAILY
Qty: 90 TABLET | Refills: 1 | Status: SHIPPED | OUTPATIENT
Start: 2024-07-16

## 2024-07-16 NOTE — PROGRESS NOTES
Chief Complaint  Hypertension (6 month follow up HTN and HLD.)    Subjective          Jim Sparks presents to [facility]    History of Present Illness  Hypertension:  Current medication: Norvasc HCTZ  Tolerating Medication: Yes  Checking BP at home and it is: 130's over 80-90  Needs refills: Yes  Labs:  Lab Results       Component                Value               Date                       GLUCOSE                  80                  02/02/2024                 BUN                      18                  02/02/2024                 CREATININE               1.07                02/02/2024                 EGFRIFNONA               72                  09/01/2021                 BCR                      16.8                02/02/2024                 K                        3.6                 02/02/2024                 CO2                      25.7                02/02/2024                 CALCIUM                  9.5                 02/02/2024                 ALBUMIN                  4.8                 02/02/2024                 LABIL2                   1.6                 08/05/2019                 AST                      22                  02/02/2024                 ALT                      17                  02/02/2024              Hyperlipidemia  Current medication: none   Lab Results       Component                Value               Date                       CHOL                     203 (H)             02/02/2024                 CHLPL                    166                 08/05/2019                 TRIG                     68                  02/02/2024                 HDL                      40                  02/02/2024                 LDL                      151 (H)             02/02/2024              AST MEDICAL HISTORY changes since 12-:         Covid + 9-2022, mild symptoms     Hypertension         PREVENTIVE HEALTH MAINTENANCE                   Surgical  History:       NONE         Family History:       Father: Hypertension;  Type 2 Diabetes, renal failure, pacemaker     Mother: Hypertension, dementia      Brother(s): 3 brother(s) total; 2 ;  Arrhythmia (  at 55 ); Hypertension;  Type 1 Diabetes (  at 50 )     Sister(s): HTN; 1 sister(s) total   Daughter: 1 healthy    Paternal Grandfather:  at age 70's;  Alcoholism     Paternal Grandmother:  at age 80's     Maternal Grandfather:  at age 30; Cause of death was CVA     Maternal Grandmother:  at age 70's; Cause of death was MI;  Cancer (unspecified type)         Social History:       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     Social History     Tobacco Use    Smoking status: Never    Smokeless tobacco: Never   Substance Use Topics    Alcohol use: Not Currently       No family history on file.     Health Maintenance Due   Topic Date Due    COLORECTAL CANCER SCREENING  Never done    TDAP/TD VACCINES (1 - Tdap) Never done    ZOSTER VACCINE (1 of 2) Never done    ANNUAL PHYSICAL  2024        Current Outpatient Medications on File Prior to Visit   Medication Sig    [DISCONTINUED] amLODIPine (NORVASC) 10 MG tablet Take 1 tablet by mouth Daily.    [DISCONTINUED] hydroCHLOROthiazide 25 MG tablet Take 1 tablet by mouth Daily.     No current facility-administered medications on file prior to visit.       Immunization History   Administered Date(s) Administered    COVID-19 (PFIZER) Purple Cap Monovalent 2021, 2021    Fluzone (or Fluarix & Flulaval for VFC) >6mos 10/04/2021       Review of Systems   Constitutional:  Negative for fatigue and fever.   Respiratory:  Negative for cough and shortness of breath.    Cardiovascular:  Negative for chest pain, palpitations and leg swelling.   Musculoskeletal:  " Positive for arthralgias (left knee / seen ortho, better now).        Objective     Vitals:    07/16/24 1641 07/16/24 1657   BP: 151/97 147/96   BP Location: Left arm Left arm   Patient Position: Sitting Sitting   Cuff Size: Large Adult Large Adult   Pulse: 86 76   Temp: 98.2 °F (36.8 °C)    TempSrc: Oral    Weight: 120 kg (263 lb 9.6 oz)    Height: 180.3 cm (71\")             Physical Exam  Vitals reviewed.   Constitutional:       General: He is not in acute distress.     Appearance: Normal appearance.   Neck:      Vascular: No carotid bruit.   Cardiovascular:      Rate and Rhythm: Normal rate and regular rhythm.      Heart sounds: Normal heart sounds. No murmur heard.  Pulmonary:      Effort: Pulmonary effort is normal. No respiratory distress.      Breath sounds: Normal breath sounds.   Musculoskeletal:      Right lower leg: No edema.      Left lower leg: No edema.   Neurological:      Mental Status: He is alert.   Psychiatric:         Mood and Affect: Mood normal.         Behavior: Behavior normal.         Result Review :     The following data was reviewed by: TRISTIN Marino on 07/16/2024:                       Assessment and Plan      Diagnoses and all orders for this visit:    1. Essential hypertension (Primary)  Assessment & Plan:  Hypertension is not to goal, repeat still up today. Continue to monitor BP at home. Continue current meds. Continue to modify diet and lifestyle. Will need labs, he will return fasting in the next 1-2 weeks        Orders:  -     amLODIPine (NORVASC) 10 MG tablet; Take 1 tablet by mouth Daily.  Dispense: 90 tablet; Refill: 1  -     hydroCHLOROthiazide 25 MG tablet; Take 1 tablet by mouth Daily.  Dispense: 90 tablet; Refill: 1  -     Comprehensive Metabolic Panel; Future  -     Lipid Panel; Future    2. Other hyperlipidemia  Assessment & Plan:   Reviewed last labs, to work on his diet and regular exercise     Orders:  -     Comprehensive Metabolic Panel; Future  -     " Lipid Panel; Future                    Follow Up     Return for fasting for labs.    Patient was given instructions and counseling regarding his condition or for health maintenance advice. Please see specific information pulled into the AVS if appropriate.

## 2024-07-16 NOTE — ASSESSMENT & PLAN NOTE
Hypertension is not to goal, repeat still up today. Continue to monitor BP at home. Continue current meds. Continue to modify diet and lifestyle. Will need labs, he will return fasting in the next 1-2 weeks

## 2024-07-30 ENCOUNTER — TELEPHONE (OUTPATIENT)
Dept: FAMILY MEDICINE CLINIC | Age: 54
End: 2024-07-30
Payer: COMMERCIAL

## 2024-08-08 DIAGNOSIS — I10 ESSENTIAL HYPERTENSION: ICD-10-CM

## 2024-08-08 RX ORDER — HYDROCHLOROTHIAZIDE 25 MG/1
25 TABLET ORAL DAILY
Qty: 90 TABLET | Refills: 1 | Status: SHIPPED | OUTPATIENT
Start: 2024-08-08

## 2024-08-08 RX ORDER — AMLODIPINE BESYLATE 10 MG/1
10 TABLET ORAL DAILY
Qty: 90 TABLET | Refills: 1 | Status: SHIPPED | OUTPATIENT
Start: 2024-08-08

## 2024-08-08 NOTE — TELEPHONE ENCOUNTER
Rx Refill Note  Requested Prescriptions     Pending Prescriptions Disp Refills    amLODIPine (NORVASC) 10 MG tablet 90 tablet 1     Sig: Take 1 tablet by mouth Daily.    hydroCHLOROthiazide 25 MG tablet 90 tablet 1     Sig: Take 1 tablet by mouth Daily.      Last office visit with prescribing clinician: 7/16/2024   Last telemedicine visit with prescribing clinician: Visit date not found   Next office visit with prescribing clinician: 1/20/2025  [facility] mail order has requested his refills be sent to them.   Sent on 07/16/24 to Prasanth Mike LPN  08/08/24, 11:44 EDT

## 2024-08-29 ENCOUNTER — TELEPHONE (OUTPATIENT)
Dept: FAMILY MEDICINE CLINIC | Age: 54
End: 2024-08-29
Payer: COMMERCIAL

## 2024-09-03 ENCOUNTER — LAB (OUTPATIENT)
Dept: LAB | Facility: HOSPITAL | Age: 54
End: 2024-09-03
Payer: COMMERCIAL

## 2024-09-03 DIAGNOSIS — E78.49 OTHER HYPERLIPIDEMIA: ICD-10-CM

## 2024-09-03 DIAGNOSIS — I10 ESSENTIAL HYPERTENSION: ICD-10-CM

## 2024-09-03 LAB
ALBUMIN SERPL-MCNC: 4.5 G/DL (ref 3.5–5.2)
ALBUMIN/GLOB SERPL: 1.4 G/DL
ALP SERPL-CCNC: 65 U/L (ref 39–117)
ALT SERPL W P-5'-P-CCNC: 23 U/L (ref 1–41)
ANION GAP SERPL CALCULATED.3IONS-SCNC: 13.9 MMOL/L (ref 5–15)
AST SERPL-CCNC: 16 U/L (ref 1–40)
BILIRUB SERPL-MCNC: 0.3 MG/DL (ref 0–1.2)
BUN SERPL-MCNC: 16 MG/DL (ref 6–20)
BUN/CREAT SERPL: 15.2 (ref 7–25)
CALCIUM SPEC-SCNC: 9.6 MG/DL (ref 8.6–10.5)
CHLORIDE SERPL-SCNC: 100 MMOL/L (ref 98–107)
CHOLEST SERPL-MCNC: 220 MG/DL (ref 0–200)
CO2 SERPL-SCNC: 25.1 MMOL/L (ref 22–29)
CREAT SERPL-MCNC: 1.05 MG/DL (ref 0.76–1.27)
EGFRCR SERPLBLD CKD-EPI 2021: 83.3 ML/MIN/1.73
GLOBULIN UR ELPH-MCNC: 3.3 GM/DL
GLUCOSE SERPL-MCNC: 114 MG/DL (ref 65–99)
HDLC SERPL-MCNC: 41 MG/DL (ref 40–60)
LDLC SERPL CALC-MCNC: 158 MG/DL (ref 0–100)
LDLC/HDLC SERPL: 3.81 {RATIO}
POTASSIUM SERPL-SCNC: 3.5 MMOL/L (ref 3.5–5.2)
PROT SERPL-MCNC: 7.8 G/DL (ref 6–8.5)
SODIUM SERPL-SCNC: 139 MMOL/L (ref 136–145)
TRIGL SERPL-MCNC: 114 MG/DL (ref 0–150)
VLDLC SERPL-MCNC: 21 MG/DL (ref 5–40)

## 2024-09-03 PROCEDURE — 80053 COMPREHEN METABOLIC PANEL: CPT

## 2024-09-03 PROCEDURE — 36415 COLL VENOUS BLD VENIPUNCTURE: CPT

## 2024-09-03 PROCEDURE — 80061 LIPID PANEL: CPT

## 2024-12-17 DIAGNOSIS — I10 ESSENTIAL HYPERTENSION: ICD-10-CM

## 2024-12-17 NOTE — TELEPHONE ENCOUNTER
Rx Refill Note  Requested Prescriptions     Pending Prescriptions Disp Refills    amLODIPine (NORVASC) 10 MG tablet [Pharmacy Med Name: amLODIPine Besylate 10 MG Oral Tablet] 90 tablet 3     Sig: TAKE 1 TABLET BY MOUTH DAILY    hydroCHLOROthiazide 25 MG tablet [Pharmacy Med Name: hydroCHLOROthiazide 25 MG Oral Tablet] 90 tablet 3     Sig: TAKE 1 TABLET BY MOUTH DAILY      Last office visit with prescribing clinician: 7/16/2024   Last telemedicine visit with prescribing clinician: Visit date not found   Next office visit with prescribing clinician: 1/22/2025  Calcium-Channel Blockers Protocol Failed        Mita Mike LPN  12/17/24, 09:06 EST

## 2024-12-18 RX ORDER — HYDROCHLOROTHIAZIDE 25 MG/1
25 TABLET ORAL DAILY
Qty: 90 TABLET | Refills: 0 | OUTPATIENT
Start: 2024-12-18

## 2024-12-18 RX ORDER — AMLODIPINE BESYLATE 10 MG/1
10 TABLET ORAL DAILY
Qty: 90 TABLET | Refills: 0 | OUTPATIENT
Start: 2024-12-18

## 2025-01-21 ENCOUNTER — TELEPHONE (OUTPATIENT)
Dept: FAMILY MEDICINE CLINIC | Age: 55
End: 2025-01-21
Payer: COMMERCIAL

## 2025-01-21 DIAGNOSIS — E78.49 OTHER HYPERLIPIDEMIA: Primary | ICD-10-CM

## 2025-01-21 DIAGNOSIS — I10 ESSENTIAL HYPERTENSION: ICD-10-CM

## 2025-01-21 NOTE — TELEPHONE ENCOUNTER
"    Caller: Jim Sparks \"Brendon\"    Relationship: Self    Best call back number: [phone]    What orders are you requesting (i.e. lab or imaging): BLOOD LAB ORDERS FOR APPT NEXT WEEK    In what timeframe would the patient need to come in: BEFORE NEXT APPT    Where will you receive your lab/imaging services: IN OFFICE    Additional notes: PATIENT IS GOING TO RESCHEDULE HIS APPT FOR ONE DAY NEXT WEEK. HE NEEDS TO HAVE HIS LABS DRAWN BEFORE THIS APPT. PLEASE PUT ORDERS IN SYSTEM AND CALL PATIENT TO LET HIM KNOW WHEN THEY ARE THERE.  MYCHART MESSAGE WHEN THEY ARE THERE IS ALSO OK.      "

## 2025-01-29 ENCOUNTER — LAB (OUTPATIENT)
Dept: LAB | Facility: HOSPITAL | Age: 55
End: 2025-01-29
Payer: COMMERCIAL

## 2025-01-29 DIAGNOSIS — I10 ESSENTIAL HYPERTENSION: ICD-10-CM

## 2025-01-29 DIAGNOSIS — E78.49 OTHER HYPERLIPIDEMIA: ICD-10-CM

## 2025-01-29 LAB
ALBUMIN SERPL-MCNC: 4.5 G/DL (ref 3.5–5.2)
ALBUMIN/GLOB SERPL: 1.4 G/DL
ALP SERPL-CCNC: 62 U/L (ref 39–117)
ALT SERPL W P-5'-P-CCNC: 21 U/L (ref 1–41)
ANION GAP SERPL CALCULATED.3IONS-SCNC: 14.3 MMOL/L (ref 5–15)
AST SERPL-CCNC: 21 U/L (ref 1–40)
BILIRUB SERPL-MCNC: 0.5 MG/DL (ref 0–1.2)
BUN SERPL-MCNC: 14 MG/DL (ref 6–20)
BUN/CREAT SERPL: 11.2 (ref 7–25)
CALCIUM SPEC-SCNC: 9.6 MG/DL (ref 8.6–10.5)
CHLORIDE SERPL-SCNC: 97 MMOL/L (ref 98–107)
CHOLEST SERPL-MCNC: 236 MG/DL (ref 0–200)
CO2 SERPL-SCNC: 27.7 MMOL/L (ref 22–29)
CREAT SERPL-MCNC: 1.25 MG/DL (ref 0.76–1.27)
EGFRCR SERPLBLD CKD-EPI 2021: 67.2 ML/MIN/1.73
GLOBULIN UR ELPH-MCNC: 3.3 GM/DL
GLUCOSE SERPL-MCNC: 87 MG/DL (ref 65–99)
HDLC SERPL-MCNC: 44 MG/DL (ref 40–60)
LDLC SERPL CALC-MCNC: 175 MG/DL (ref 0–100)
LDLC/HDLC SERPL: 3.93 {RATIO}
POTASSIUM SERPL-SCNC: 3.6 MMOL/L (ref 3.5–5.2)
PROT SERPL-MCNC: 7.8 G/DL (ref 6–8.5)
SODIUM SERPL-SCNC: 139 MMOL/L (ref 136–145)
TRIGL SERPL-MCNC: 96 MG/DL (ref 0–150)
VLDLC SERPL-MCNC: 17 MG/DL (ref 5–40)

## 2025-01-29 PROCEDURE — 80053 COMPREHEN METABOLIC PANEL: CPT

## 2025-01-29 PROCEDURE — 80061 LIPID PANEL: CPT

## 2025-01-29 PROCEDURE — 36415 COLL VENOUS BLD VENIPUNCTURE: CPT

## 2025-01-30 ENCOUNTER — OFFICE VISIT (OUTPATIENT)
Dept: FAMILY MEDICINE CLINIC | Age: 55
End: 2025-01-30
Payer: COMMERCIAL

## 2025-01-30 VITALS
HEIGHT: 71 IN | TEMPERATURE: 98.1 F | WEIGHT: 263 LBS | OXYGEN SATURATION: 95 % | HEART RATE: 72 BPM | DIASTOLIC BLOOD PRESSURE: 90 MMHG | SYSTOLIC BLOOD PRESSURE: 140 MMHG | BODY MASS INDEX: 36.82 KG/M2

## 2025-01-30 DIAGNOSIS — I10 ESSENTIAL HYPERTENSION: ICD-10-CM

## 2025-01-30 DIAGNOSIS — E78.49 OTHER HYPERLIPIDEMIA: ICD-10-CM

## 2025-01-30 DIAGNOSIS — Z12.5 SCREENING FOR PROSTATE CANCER: ICD-10-CM

## 2025-01-30 DIAGNOSIS — Z00.00 ROUTINE GENERAL MEDICAL EXAMINATION AT A HEALTH CARE FACILITY: Primary | ICD-10-CM

## 2025-01-30 DIAGNOSIS — Z12.11 SCREEN FOR COLON CANCER: ICD-10-CM

## 2025-01-30 PROCEDURE — 99396 PREV VISIT EST AGE 40-64: CPT | Performed by: NURSE PRACTITIONER

## 2025-01-30 RX ORDER — AMLODIPINE BESYLATE 10 MG/1
10 TABLET ORAL DAILY
Qty: 90 TABLET | Refills: 1 | Status: SHIPPED | OUTPATIENT
Start: 2025-01-30

## 2025-01-30 RX ORDER — HYDROCHLOROTHIAZIDE 25 MG/1
25 TABLET ORAL DAILY
Qty: 90 TABLET | Refills: 1 | Status: SHIPPED | OUTPATIENT
Start: 2025-01-30

## 2025-01-30 NOTE — ASSESSMENT & PLAN NOTE
Repeat bp improved. advised to monitor BP at home. Continue current meds. Continue to modify diet and lifestyle.

## 2025-01-30 NOTE — ASSESSMENT & PLAN NOTE
Reviewed labs from yesterday, cholesterol higher, continue exercising, advised to make changes to his diet and recheck labs in 2 months.

## 2025-01-30 NOTE — PROGRESS NOTES
Chief Complaint  Annual Exam and follow up     Subjective          Jim Sparks presents to [facility]    History of Present Illness  General Health Questions  Regular exercise as least 3 days a week: yes, just started this and gets 10,000 steps daily    Follows a healthy diet: does that interm fasting, but not healthy choices  Wears seat belt always: yes   Drinks Alcohol: socially   Uses Recreational drugs: none   Uses tobacco products: none   UTD on Tetanus vaccine: none in epic   Last PSA:normal 2-2-24  Last colon screen:never, considering but wants to go to [facility]  Optometrist: not UTD, considering Dr Quick   Dentist: appt today , [facility]     Hypertension:  Current medication: Norvasc HCTZ   Tolerating Medication: Yes  Checking BP at home and it is: not checking at home   Needs refills: Yes to [facility]   Labs:  Lab Results       Component                Value               Date                       GLUCOSE                  87                  01/29/2025                 BUN                      14                  01/29/2025                 CREATININE               1.25                01/29/2025                 EGFRIFNONA               72                  09/01/2021                 BCR                      11.2                01/29/2025                 K                        3.6                 01/29/2025                 CO2                      27.7                01/29/2025                 CALCIUM                  9.6                 01/29/2025                 ALBUMIN                  4.5                 01/29/2025                 LABIL2                   1.6                 08/05/2019                 AST                      21                  01/29/2025                 ALT                      21                  01/29/2025              Hyperlipidemia  Exercies now and not eating healthy   Current medication: none       Lab Results       Component                 Value               Date                       CHOL                     236 (H)             2025                 CHLPL                    166                 2019                 TRIG                     96                  2025                 HDL                      44                  2025                 LDL                      175 (H)             2025                PAST MEDICAL HISTORY changes since 2024:         Covid + , mild symptoms     Hypertension         PREVENTIVE HEALTH MAINTENANCE                   Surgical History:        NONE         Family History:        Father: Hypertension;  Type 2 Diabetes, renal failure, pacemaker     Mother: Hypertension, dementia      Brother(s): 3 brother(s) total; 2 ;  Arrhythmia (  at 55 ); Hypertension;  Type 1 Diabetes (  at 50 )     Sister(s): HTN; 1 sister(s) total   Daughter: 1 healthy    Paternal Grandfather:  at age 70's;  Alcoholism     Paternal Grandmother:  at age 80's     Maternal Grandfather:  at age 30; Cause of death was CVA     Maternal Grandmother:  at age 70's; Cause of death was MI;  Cancer (unspecified type)         Social History:        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 No pertinent surgical history.     Social History     Tobacco Use    Smoking status: Never    Smokeless tobacco: Never   Substance Use Topics    Alcohol use: Not Currently       History reviewed. No pertinent family history.     Health Maintenance Due   Topic Date Due    COLORECTAL CANCER SCREENING  Never done    TDAP/TD VACCINES (1 - Tdap) Never done    ZOSTER VACCINE (1 of 2) Never done    ANNUAL PHYSICAL  2024    INFLUENZA VACCINE  2024    COVID-19 Vaccine (3 - 2024-25 season) 2024        Current Outpatient Medications on File  "Prior to Visit   Medication Sig    [DISCONTINUED] amLODIPine (NORVASC) 10 MG tablet Take 1 tablet by mouth Daily.    [DISCONTINUED] hydroCHLOROthiazide 25 MG tablet Take 1 tablet by mouth Daily.     No current facility-administered medications on file prior to visit.       Immunization History   Administered Date(s) Administered    COVID-19 (PFIZER) Purple Cap Monovalent 04/02/2021, 04/23/2021    Fluzone (or Fluarix & Flulaval for VFC) >6mos 10/04/2021       Review of Systems   Constitutional:  Negative for fatigue and fever.   HENT:  Negative for ear pain and sore throat.    Eyes:  Negative for blurred vision.   Respiratory:  Negative for cough and shortness of breath.    Cardiovascular:  Negative for chest pain, palpitations and leg swelling.   Gastrointestinal:  Negative for abdominal pain, blood in stool, constipation, diarrhea, nausea and vomiting.   Genitourinary:  Negative for difficulty urinating.   Musculoskeletal:  Negative for arthralgias and myalgias.   Skin:  Negative for rash.   Neurological:  Negative for dizziness, weakness and headache.   Psychiatric/Behavioral:  Negative for sleep disturbance and depressed mood.         Objective     Vitals:    01/30/25 0916 01/30/25 0939   BP: 152/92 140/90   BP Location: Right arm Left arm   Patient Position: Sitting Sitting   Cuff Size: Large Adult Large Adult   Pulse: 72    Temp: 98.1 °F (36.7 °C)    TempSrc: Oral    SpO2: 95%    Weight: 119 kg (263 lb)    Height: 180.3 cm (71\")             Physical Exam  Vitals reviewed.   Constitutional:       Appearance: Normal appearance. He is well-developed.   HENT:      Head: Normocephalic and atraumatic.      Right Ear: External ear normal.      Left Ear: External ear normal.      Mouth/Throat:      Pharynx: No oropharyngeal exudate.   Eyes:      Conjunctiva/sclera: Conjunctivae normal.      Pupils: Pupils are equal, round, and reactive to light.   Cardiovascular:      Rate and Rhythm: Normal rate and regular rhythm.    "   Heart sounds: No murmur heard.     No friction rub. No gallop.   Pulmonary:      Effort: Pulmonary effort is normal.      Breath sounds: Normal breath sounds. No wheezing or rhonchi.   Abdominal:      General: Bowel sounds are normal. There is no distension.      Palpations: Abdomen is soft.      Tenderness: There is no abdominal tenderness.      Comments:       Skin:     General: Skin is warm and dry.   Neurological:      Mental Status: He is alert and oriented to person, place, and time.      Cranial Nerves: No cranial nerve deficit.   Psychiatric:         Mood and Affect: Mood and affect normal.         Behavior: Behavior normal.         Thought Content: Thought content normal.         Judgment: Judgment normal.         Result Review :     The following data was reviewed by: TRISTIN Marino on 01/30/2025:                       Assessment and Plan      Diagnoses and all orders for this visit:    1. Routine general medical examination at a health care facility (Primary)  Assessment & Plan:  Advise regular exercise, healthy eating, always wear seat belts.   Immunizations discussed, he will get a Tdap, will check with his pharmacy on coverage.   To continue yearly dental exams, and set up yearly optometry exam.        Orders:  -     Comprehensive Metabolic Panel; Future  -     CBC & Differential; Future  -     TSH; Future  -     Lipid Panel; Future  -     PSA Screen; Future    2. Other hyperlipidemia  Assessment & Plan:   Reviewed labs from yesterday, cholesterol higher, continue exercising, advised to make changes to his diet and recheck labs in 2 months.      Orders:  -     Comprehensive Metabolic Panel; Future  -     Lipid Panel; Future    3. Essential hypertension  Assessment & Plan:  Repeat bp improved. advised to monitor BP at home. Continue current meds. Continue to modify diet and lifestyle.     Orders:  -     Comprehensive Metabolic Panel; Future  -     amLODIPine (NORVASC) 10 MG tablet; Take 1  tablet by mouth Daily.  Dispense: 90 tablet; Refill: 1  -     hydroCHLOROthiazide 25 MG tablet; Take 1 tablet by mouth Daily.  Dispense: 90 tablet; Refill: 1    4. Screen for colon cancer  Assessment & Plan:  Discussed, advised CLN, He will let me know who he wants to do his CLN at [facility]       5. Screening for prostate cancer  Assessment & Plan:  Reviewed chart, discussed and will check a PSA    Orders:  -     PSA Screen; Future        Class 2 Severe Obesity (BMI >=35 and <=39.9). Obesity-related health conditions include the following: hypertension and dyslipidemias. Obesity is unchanged. BMI is is above average; BMI management plan is completed. We discussed portion control and increasing exercise.           Follow Up     Return for fasting for labs.    Patient was given instructions and counseling regarding his condition or for health maintenance advice. Please see specific information pulled into the AVS if appropriate.

## 2025-01-30 NOTE — ASSESSMENT & PLAN NOTE
Advise regular exercise, healthy eating, always wear seat belts.   Immunizations discussed, he will get a Tdap, will check with his pharmacy on coverage.   To continue yearly dental exams, and set up yearly optometry exam.

## 2025-02-24 ENCOUNTER — OFFICE VISIT (OUTPATIENT)
Dept: FAMILY MEDICINE CLINIC | Age: 55
End: 2025-02-24
Payer: COMMERCIAL

## 2025-02-24 VITALS
WEIGHT: 265.4 LBS | BODY MASS INDEX: 37.15 KG/M2 | TEMPERATURE: 98.5 F | DIASTOLIC BLOOD PRESSURE: 97 MMHG | HEIGHT: 71 IN | RESPIRATION RATE: 20 BRPM | OXYGEN SATURATION: 95 % | SYSTOLIC BLOOD PRESSURE: 160 MMHG | HEART RATE: 80 BPM

## 2025-02-24 DIAGNOSIS — J40 BRONCHITIS: ICD-10-CM

## 2025-02-24 DIAGNOSIS — J02.9 SORE THROAT: Primary | ICD-10-CM

## 2025-02-24 DIAGNOSIS — I10 ESSENTIAL HYPERTENSION: ICD-10-CM

## 2025-02-24 DIAGNOSIS — R09.81 NASAL CONGESTION: ICD-10-CM

## 2025-02-24 LAB
EXPIRATION DATE: NORMAL
EXPIRATION DATE: NORMAL
FLUAV AG UPPER RESP QL IA.RAPID: NOT DETECTED
FLUBV AG UPPER RESP QL IA.RAPID: NOT DETECTED
INTERNAL CONTROL: NORMAL
INTERNAL CONTROL: NORMAL
Lab: NORMAL
Lab: NORMAL
S PYO AG THROAT QL: NEGATIVE
SARS-COV-2 AG UPPER RESP QL IA.RAPID: NOT DETECTED

## 2025-02-24 PROCEDURE — 87081 CULTURE SCREEN ONLY: CPT | Performed by: NURSE PRACTITIONER

## 2025-02-24 PROCEDURE — 99213 OFFICE O/P EST LOW 20 MIN: CPT | Performed by: NURSE PRACTITIONER

## 2025-02-24 PROCEDURE — 87880 STREP A ASSAY W/OPTIC: CPT | Performed by: NURSE PRACTITIONER

## 2025-02-24 PROCEDURE — 87428 SARSCOV & INF VIR A&B AG IA: CPT | Performed by: NURSE PRACTITIONER

## 2025-02-24 RX ORDER — AZITHROMYCIN 250 MG/1
TABLET, FILM COATED ORAL
Qty: 6 TABLET | Refills: 0 | Status: SHIPPED | OUTPATIENT
Start: 2025-02-24

## 2025-02-24 NOTE — PROGRESS NOTES
Chief Complaint  Sore Throat (4 days ) and Nasal Congestion    Subjective          Jim Sparks presents to [facility]  History of Present Illness  URI  When did symptoms started 4 days ago   Any exposures:coworkers with some URI  Symptoms:sore throat and nasal congestion, yellow and green drainage consistently, coughing at times, worse at night  Treatment tried:advil cold and sinus, not helping     PAST MEDICAL HISTORY changes since 2025:         Covid + , mild symptoms     Hypertension         PREVENTIVE HEALTH MAINTENANCE                   Surgical History:        NONE         Family History:        Father: Hypertension;  Type 2 Diabetes, renal failure, pacemaker     Mother: Hypertension, dementia      Brother(s): 3 brother(s) total; 2 ;  Arrhythmia (  at 55 ); Hypertension;  Type 1 Diabetes (  at 50 )     Sister(s): HTN; 1 sister(s) total   Daughter: 1 healthy    Paternal Grandfather:  at age 70's;  Alcoholism     Paternal Grandmother:  at age 80's     Maternal Grandfather:  at age 30; Cause of death was CVA     Maternal Grandmother:  at age 70's; Cause of death was MI;  Cancer (unspecified type)         Social History:        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 No pertinent surgical history.     Social History     Tobacco Use    Smoking status: Never    Smokeless tobacco: Never   Substance Use Topics    Alcohol use: Not Currently       History reviewed. No pertinent family history.     Health Maintenance Due   Topic Date Due    TDAP/TD VACCINES (1 - Tdap) Never done    COLORECTAL CANCER SCREENING  Never done    Pneumococcal Vaccine 50+ (1 of 1 - PCV) Never done    ZOSTER VACCINE (1 of 2) Never done    INFLUENZA VACCINE  2024    COVID-19 Vaccine (3 - 2024-  "season) 09/01/2024        Current Outpatient Medications on File Prior to Visit   Medication Sig    amLODIPine (NORVASC) 10 MG tablet Take 1 tablet by mouth Daily.    hydroCHLOROthiazide 25 MG tablet Take 1 tablet by mouth Daily.     No current facility-administered medications on file prior to visit.       Immunization History   Administered Date(s) Administered    COVID-19 (PFIZER) Purple Cap Monovalent 04/02/2021, 04/23/2021    Fluzone (or Fluarix & Flulaval for VFC) >6mos 10/04/2021       Review of Systems   Constitutional:  Negative for fever.   HENT:  Positive for congestion and sore throat.    Respiratory:  Positive for cough. Negative for shortness of breath.    Cardiovascular:  Negative for chest pain.        Objective     Vitals:    02/24/25 0910 02/24/25 0949   BP: 159/95 160/97   BP Location: Left arm Left arm   Patient Position: Sitting Sitting   Cuff Size: Adult Adult   Pulse: 88 80   Resp: 20    Temp: 98.5 °F (36.9 °C)    TempSrc: Oral    SpO2: 95%  Comment: room air    Weight: 120 kg (265 lb 6.4 oz)    Height: 180 cm (70.87\")             Physical Exam  Constitutional:       General: He is not in acute distress.     Appearance: Normal appearance.   HENT:      Right Ear: Tympanic membrane, ear canal and external ear normal.      Left Ear: Tympanic membrane, ear canal and external ear normal.      Nose: Nose normal.      Mouth/Throat:      Pharynx: Oropharynx is clear. No posterior oropharyngeal erythema.   Cardiovascular:      Rate and Rhythm: Normal rate and regular rhythm.      Heart sounds: No murmur heard.  Pulmonary:      Effort: Pulmonary effort is normal.      Breath sounds: Normal breath sounds.   Lymphadenopathy:      Cervical: No cervical adenopathy.   Neurological:      Mental Status: He is alert.   Psychiatric:         Mood and Affect: Mood normal.         Behavior: Behavior normal.         Result Review :   Results for orders placed or performed in visit on 02/24/25   POCT rapid strep A    " Collection Time: 02/24/25  9:30 AM    Specimen: Swab   Result Value Ref Range    Rapid Strep A Screen Negative Negative, VALID, INVALID, Not Performed    Internal Control Passed Passed    Lot Number [id number]     Expiration Date 11-30-25    POCT SARS-CoV-2 Antigen WILLIE + Flu    Collection Time: 02/24/25  9:39 AM    Specimen: Swab   Result Value Ref Range    SARS Antigen Not Detected Not Detected, Presumptive Negative    Influenza A Antigen WILLIE Not Detected Not Detected    Influenza B Antigen WILLIE Not Detected Not Detected    Internal Control Passed Passed    Lot Number [id number]     Expiration Date 7-18-25        The following data was reviewed by: TRISTIN Marino on 02/24/2025:                       Assessment and Plan      Diagnoses and all orders for this visit:    1. Sore throat (Primary)  Assessment & Plan:  Warm salt water gargles, Rest, increase fluids, follow up if symptoms progress or change   RSS neg/ TC pending   Covid screen: neg  Flu screen: neg    Orders:  -     POCT SARS-CoV-2 Antigen WILLIE + Flu  -     POCT rapid strep A  -     Beta Strep Culture, Throat - , Throat; Future  -     Beta Strep Culture, Throat - Swab, Throat    2. Nasal congestion  Assessment & Plan:  Try mucinex during the day     Orders:  -     POCT SARS-CoV-2 Antigen WILLIE + Flu  -     POCT rapid strep A    3. Essential hypertension  Assessment & Plan:  Avoid decongestants, repeat BP still up, have his bp rechecked       4. Bronchitis  Assessment & Plan:  Cover with Zpack, try mucinex DM at night     Orders:  -     azithromycin (Zithromax Z-Jonathan) 250 MG tablet; Take 2 tablets by mouth on day 1, then 1 tablet daily on days 2-5  Dispense: 6 tablet; Refill: 0                  Follow Up     Return if symptoms worsen or fail to improve.    Patient was given instructions and counseling regarding his condition or for health maintenance advice. Please see specific information pulled into the AVS if appropriate.

## 2025-02-24 NOTE — ASSESSMENT & PLAN NOTE
Warm salt water gargles, Rest, increase fluids, follow up if symptoms progress or change   RSS neg/ TC pending   Covid screen: neg  Flu screen: neg

## 2025-02-26 LAB — BACTERIA SPEC AEROBE CULT: NORMAL

## 2025-04-14 ENCOUNTER — TELEPHONE (OUTPATIENT)
Dept: FAMILY MEDICINE CLINIC | Age: 55
End: 2025-04-14
Payer: COMMERCIAL

## 2025-04-14 NOTE — TELEPHONE ENCOUNTER
1st attempt: spoke with pt re overdue labs ordered 1/30/25. He is out of town right now, will PP X 2 weeks.

## 2025-06-19 DIAGNOSIS — I10 ESSENTIAL HYPERTENSION: ICD-10-CM

## 2025-06-20 NOTE — TELEPHONE ENCOUNTER
Rx Refill Note  Requested Prescriptions     Pending Prescriptions Disp Refills    amLODIPine (NORVASC) 10 MG tablet [Pharmacy Med Name: amLODIPine Besylate 10 MG Oral Tablet] 90 tablet 3     Sig: TAKE 1 TABLET BY MOUTH DAILY    hydroCHLOROthiazide 25 MG tablet [Pharmacy Med Name: hydroCHLOROthiazide 25 MG Oral Tablet] 90 tablet 3     Sig: TAKE 1 TABLET BY MOUTH DAILY      Last office visit with prescribing clinician: 2/24/2025   Last telemedicine visit with prescribing clinician: Visit date not found   Next office visit with prescribing clinician: 8/4/2025  Calcium-Channel Blockers Protocol Failed     Mita Mike LPN  06/20/25, 11:29 EDT

## 2025-06-22 RX ORDER — HYDROCHLOROTHIAZIDE 25 MG/1
25 TABLET ORAL DAILY
Qty: 90 TABLET | Refills: 0 | Status: SHIPPED | OUTPATIENT
Start: 2025-06-22

## 2025-06-22 RX ORDER — AMLODIPINE BESYLATE 10 MG/1
10 TABLET ORAL DAILY
Qty: 90 TABLET | Refills: 0 | Status: SHIPPED | OUTPATIENT
Start: 2025-06-22

## 2025-06-22 NOTE — TELEPHONE ENCOUNTER
I sent refill for 90 days, not the additional 3 refills / year supply , bp was high and last visit was a URI, he needs follow up